# Patient Record
Sex: MALE | Race: WHITE | NOT HISPANIC OR LATINO | Employment: FULL TIME | URBAN - METROPOLITAN AREA
[De-identification: names, ages, dates, MRNs, and addresses within clinical notes are randomized per-mention and may not be internally consistent; named-entity substitution may affect disease eponyms.]

---

## 2017-11-28 ENCOUNTER — OFFICE VISIT (OUTPATIENT)
Dept: URGENT CARE | Facility: CLINIC | Age: 32
End: 2017-11-28
Payer: COMMERCIAL

## 2017-11-28 VITALS
HEIGHT: 72 IN | HEART RATE: 84 BPM | WEIGHT: 125 LBS | SYSTOLIC BLOOD PRESSURE: 90 MMHG | BODY MASS INDEX: 16.93 KG/M2 | TEMPERATURE: 99.3 F | OXYGEN SATURATION: 96 % | DIASTOLIC BLOOD PRESSURE: 66 MMHG

## 2017-11-28 DIAGNOSIS — J10.1 INFLUENZA A: ICD-10-CM

## 2017-11-28 LAB
FLUAV+FLUBV AG SPEC QL IA: NORMAL
INT CON NEG: NEGATIVE
INT CON POS: POSITIVE

## 2017-11-28 PROCEDURE — 87804 INFLUENZA ASSAY W/OPTIC: CPT | Performed by: PHYSICIAN ASSISTANT

## 2017-11-28 PROCEDURE — 99204 OFFICE O/P NEW MOD 45 MIN: CPT | Performed by: PHYSICIAN ASSISTANT

## 2017-11-28 RX ORDER — IBUPROFEN 800 MG/1
800 TABLET ORAL EVERY 8 HOURS PRN
COMMUNITY
End: 2018-04-02

## 2017-11-28 RX ORDER — ACETAMINOPHEN 500 MG
500-1000 TABLET ORAL EVERY 6 HOURS PRN
COMMUNITY
End: 2018-04-02

## 2017-11-28 RX ORDER — BENZONATATE 200 MG/1
200 CAPSULE ORAL 3 TIMES DAILY PRN
Qty: 30 CAP | Refills: 0 | Status: SHIPPED | OUTPATIENT
Start: 2017-11-28 | End: 2018-04-02

## 2017-11-28 RX ORDER — OSELTAMIVIR PHOSPHATE 75 MG/1
75 CAPSULE ORAL 2 TIMES DAILY
Qty: 10 CAP | Refills: 0 | Status: SHIPPED | OUTPATIENT
Start: 2017-11-28 | End: 2017-12-03

## 2017-11-28 ASSESSMENT — ENCOUNTER SYMPTOMS
SENSORY CHANGE: 0
SPUTUM PRODUCTION: 0
RHINORRHEA: 1
FEVER: 1
TINGLING: 0
COUGH: 1
SHORTNESS OF BREATH: 0
SORE THROAT: 1
CHILLS: 1
WHEEZING: 0
DIZZINESS: 0
SINUS PAIN: 0
FOCAL WEAKNESS: 0
VOMITING: 1
MYALGIAS: 1
PALPITATIONS: 0
HEADACHES: 1
NAUSEA: 1
HEMOPTYSIS: 0

## 2017-11-28 ASSESSMENT — COPD QUESTIONNAIRES: COPD: 0

## 2017-11-28 NOTE — LETTER
November 28, 2017         Patient: Mike Culver   YOB: 1985   Date of Visit: 11/28/2017           To Whom it May Concern:     Mike Culver was seen in my clinic on 11/28/2017. He may return to work on 12/1/17 if feeling better.    If you have any questions or concerns, please don't hesitate to call.        Sincerely,           Darlin Pino P.A.-C.  Electronically Signed

## 2017-11-29 NOTE — PROGRESS NOTES
Subjective:      Mike Culver is a 32 y.o. male who presents with Cough (sore throat, nausea, fevers, x 4 days)            Cough   This is a new problem. Episode onset: 2 days. The problem has been unchanged. The cough is non-productive. Associated symptoms include chills, a fever, headaches, myalgias, nasal congestion, rhinorrhea and a sore throat. Pertinent negatives include no chest pain, ear congestion, ear pain, hemoptysis, rash, shortness of breath or wheezing. Nothing aggravates the symptoms. Treatments tried: OTC cold and flu medication. The treatment provided mild relief. There is no history of asthma, bronchitis, COPD or pneumonia.     History reviewed. No pertinent past medical history.  History reviewed. No pertinent surgical history.    History reviewed. No pertinent family history.    No Known Allergies    Medications, Allergies, and current problem list reviewed today in Epic      Review of Systems   Constitutional: Positive for chills, fever and malaise/fatigue.   HENT: Positive for congestion, rhinorrhea and sore throat. Negative for ear discharge, ear pain and sinus pain.    Respiratory: Positive for cough. Negative for hemoptysis, sputum production, shortness of breath and wheezing.    Cardiovascular: Negative for chest pain, palpitations and leg swelling.   Gastrointestinal: Positive for nausea and vomiting.   Musculoskeletal: Positive for myalgias.   Skin: Negative for rash.   Neurological: Positive for headaches. Negative for dizziness, tingling, sensory change and focal weakness.     All other systems reviewed and are negative.        Objective:     BP (!) 90/66   Pulse 84   Temp 37.4 °C (99.3 °F)   Ht 1.829 m (6')   Wt 56.7 kg (125 lb)   SpO2 96%   BMI 16.95 kg/m²      Physical Exam   Constitutional: He is oriented to person, place, and time. He appears well-developed and well-nourished. He appears distressed (ill appearing ).   HENT:   Head: Normocephalic and atraumatic.   Right  Ear: Tympanic membrane, external ear and ear canal normal.   Left Ear: Tympanic membrane, external ear and ear canal normal.   Nose: Mucosal edema and rhinorrhea present.   Mouth/Throat: Uvula is midline and mucous membranes are normal. Posterior oropharyngeal erythema (mild) present.   Eyes: Conjunctivae are normal.   Cardiovascular: Normal rate, regular rhythm and normal heart sounds.  Exam reveals no gallop and no friction rub.    No murmur heard.  Pulmonary/Chest: Effort normal and breath sounds normal. No respiratory distress. He has no decreased breath sounds. He has no wheezes. He has no rhonchi. He has no rales.   Lymphadenopathy:     He has no cervical adenopathy.   Neurological: He is alert and oriented to person, place, and time. No cranial nerve deficit.   Psychiatric: He has a normal mood and affect. His behavior is normal. Judgment and thought content normal.               Assessment/Plan:     1. Influenza A    - POCT Influenza A/B  - oseltamivir (TAMIFLU) 75 MG Cap; Take 1 Cap by mouth 2 times a day for 5 days.  Dispense: 10 Cap; Refill: 0  - benzonatate (TESSALON) 200 MG capsule; Take 1 Cap by mouth 3 times a day as needed for Cough.  Dispense: 30 Cap; Refill: 0     Differential diagnoses, Supportive care, and indications for immediate follow-up discussed with patient.   Instructed to return to clinic or nearest emergency department for any change in condition, further concerns, or worsening of symptoms.    .The patient demonstrated a good understanding and agreed with the treatment plan.    Darlin Pino P.A.-C.

## 2018-03-30 ENCOUNTER — OFFICE VISIT (OUTPATIENT)
Dept: URGENT CARE | Facility: CLINIC | Age: 33
End: 2018-03-30
Payer: COMMERCIAL

## 2018-03-30 VITALS
BODY MASS INDEX: 16.93 KG/M2 | TEMPERATURE: 98 F | OXYGEN SATURATION: 96 % | RESPIRATION RATE: 16 BRPM | HEIGHT: 72 IN | WEIGHT: 125 LBS | SYSTOLIC BLOOD PRESSURE: 116 MMHG | HEART RATE: 57 BPM | DIASTOLIC BLOOD PRESSURE: 82 MMHG

## 2018-03-30 DIAGNOSIS — G43.719 INTRACTABLE CHRONIC MIGRAINE WITHOUT AURA AND WITHOUT STATUS MIGRAINOSUS: ICD-10-CM

## 2018-03-30 PROCEDURE — 99214 OFFICE O/P EST MOD 30 MIN: CPT | Mod: 25 | Performed by: PHYSICIAN ASSISTANT

## 2018-03-30 RX ORDER — KETOROLAC TROMETHAMINE 30 MG/ML
60 INJECTION, SOLUTION INTRAMUSCULAR; INTRAVENOUS ONCE
Status: COMPLETED | OUTPATIENT
Start: 2018-03-30 | End: 2018-03-30

## 2018-03-30 RX ORDER — SUMATRIPTAN 50 MG/1
50 TABLET, FILM COATED ORAL
Qty: 10 TAB | Refills: 0 | Status: SHIPPED | OUTPATIENT
Start: 2018-03-30 | End: 2018-04-02

## 2018-03-30 RX ADMIN — KETOROLAC TROMETHAMINE 60 MG: 30 INJECTION, SOLUTION INTRAMUSCULAR; INTRAVENOUS at 17:42

## 2018-03-30 ASSESSMENT — ENCOUNTER SYMPTOMS
MIGRAINE HEADACHES: 1
MYALGIAS: 0
FEVER: 0
NUMBNESS: 0
HEADACHES: 1
EYE PAIN: 0
SENSORY CHANGE: 0
EYE REDNESS: 0
SINUS PAIN: 0
BLURRED VISION: 0
FOCAL WEAKNESS: 0
LOSS OF BALANCE: 0
SINUS PRESSURE: 0
SEIZURES: 0
NAUSEA: 1
CHILLS: 0
DIZZINESS: 1
ABDOMINAL PAIN: 0
TINGLING: 0
VISUAL CHANGE: 1
VOMITING: 0
SHORTNESS OF BREATH: 0
ABNORMAL BEHAVIOR: 0
PHOTOPHOBIA: 0
CLUSTER HEADACHES: 0

## 2018-03-31 NOTE — PROGRESS NOTES
Subjective:      Mike Culver is a 33 y.o. male who presents with Migraine (x 1 week  / nausea )            Headache    This is a new problem. Episode onset: 1 week  The problem occurs constantly. The problem has been waxing and waning. The pain is located in the vertex region. The pain does not radiate. The pain quality is similar to prior headaches (besides occurence of blurred vision yesterday). The quality of the pain is described as aching. The pain is at a severity of 3/10. Associated symptoms include dizziness (slight dizziness), nausea and a visual change (small visual change yesterday). Pertinent negatives include no abdominal pain, abnormal behavior, blurred vision, drainage, ear pain, eye pain, eye redness, fever, loss of balance, muscle aches, numbness, phonophobia, photophobia, seizures, sinus pressure, tingling or vomiting. The symptoms are aggravated by emotional stress. Treatments tried: midol, Ibuprofen. The treatment provided no relief. His past medical history is significant for migraine headaches and migraines in the family. There is no history of cancer, cluster headaches, hypertension, recent head traumas or sinus disease.       History reviewed. No pertinent past medical history.    History reviewed. No pertinent surgical history.    History reviewed. No pertinent family history.    No Known Allergies    Medications, Allergies, and current problem list reviewed today in Epic    Review of Systems   Constitutional: Negative for chills, fever and malaise/fatigue.   HENT: Negative for ear pain, sinus pain and sinus pressure.    Eyes: Negative for blurred vision, photophobia, pain and redness.   Respiratory: Negative for shortness of breath.    Cardiovascular: Negative for chest pain and leg swelling.   Gastrointestinal: Positive for nausea. Negative for abdominal pain and vomiting.   Musculoskeletal: Negative for myalgias.   Skin: Negative for rash.   Neurological: Positive for dizziness  (slight dizziness) and headaches. Negative for tingling, sensory change, focal weakness, seizures, numbness and loss of balance.     All other systems reviewed and are negative.        Objective:     /82   Pulse (!) 57   Temp 36.7 °C (98 °F)   Resp 16   Ht 1.829 m (6')   Wt 56.7 kg (125 lb)   SpO2 96%   BMI 16.95 kg/m²      Physical Exam   Constitutional: He is oriented to person, place, and time. He appears well-developed and well-nourished. No distress.   HENT:   Head: Normocephalic and atraumatic.   Eyes: Conjunctivae and EOM are normal. Pupils are equal, round, and reactive to light.   Neck: Neck supple.   Cardiovascular: Normal rate, regular rhythm and normal heart sounds.    Pulmonary/Chest: Effort normal and breath sounds normal. No respiratory distress. He has no wheezes. He has no rales.   Lymphadenopathy:     He has no cervical adenopathy.   Neurological: He is alert and oriented to person, place, and time.   CN II-XII intact. Cerebellar function  intact. Motor coordination intact.  strength strong and symmetrical bilaterally. Proprioception intact. Strength 5/5 equal in the upper and lower extremities. Facial features symmetric with equal movement. No focal deficits. Romberg negative     Skin: Skin is warm and dry. No rash noted.   Psychiatric: He has a normal mood and affect. His behavior is normal. Judgment and thought content normal.               Assessment/Plan:     1. Intractable chronic migraine without aura and without status migrainosus  ketorolac (TORADOL) injection 60 mg    REFERRAL TO FAMILY PRACTICE    SUMAtriptan (IMITREX) 50 MG Tab       Toradol 60 mg Im given today. Patient reports relief after shot.   Referral placed to Family Practice to follow-up with PCP    Current Outpatient Prescriptions:   •  SUMAtriptan (IMITREX) 50 MG Tab, Take 1 Tab by mouth Once PRN for Migraine for up to 1 dose. May repeat dose x 1 after 2 hours. Do not exceed 100 mg/24 hours., Disp: 10 Tab,  Rfl: 0     Differential diagnoses, Supportive care, and indications for immediate follow-up discussed with patient.   Instructed to return to clinic or nearest emergency department for any change in condition, further concerns, or worsening of symptoms.    The patient demonstrated a good understanding and agreed with the treatment plan.    Darlin Pino P.A.-C.

## 2018-04-02 ENCOUNTER — OFFICE VISIT (OUTPATIENT)
Dept: MEDICAL GROUP | Facility: MEDICAL CENTER | Age: 33
End: 2018-04-02
Payer: COMMERCIAL

## 2018-04-02 VITALS
RESPIRATION RATE: 14 BRPM | BODY MASS INDEX: 17.62 KG/M2 | TEMPERATURE: 98.2 F | WEIGHT: 130.07 LBS | HEIGHT: 72 IN | SYSTOLIC BLOOD PRESSURE: 100 MMHG | DIASTOLIC BLOOD PRESSURE: 64 MMHG | OXYGEN SATURATION: 96 % | HEART RATE: 58 BPM

## 2018-04-02 DIAGNOSIS — Z87.39 H/O JUVENILE RHEUMATOID ARTHRITIS: ICD-10-CM

## 2018-04-02 DIAGNOSIS — G40.309 NONINTRACTABLE GENERALIZED IDIOPATHIC EPILEPSY WITHOUT STATUS EPILEPTICUS (HCC): ICD-10-CM

## 2018-04-02 DIAGNOSIS — G43.109 MIGRAINE WITH AURA AND WITHOUT STATUS MIGRAINOSUS, NOT INTRACTABLE: ICD-10-CM

## 2018-04-02 PROCEDURE — 99214 OFFICE O/P EST MOD 30 MIN: CPT | Performed by: FAMILY MEDICINE

## 2018-04-02 RX ORDER — SUMATRIPTAN 100 MG/1
100 TABLET, FILM COATED ORAL
Qty: 10 TAB | Refills: 3 | Status: SHIPPED | OUTPATIENT
Start: 2018-04-02 | End: 2018-05-16 | Stop reason: SDUPTHER

## 2018-04-02 ASSESSMENT — PATIENT HEALTH QUESTIONNAIRE - PHQ9: CLINICAL INTERPRETATION OF PHQ2 SCORE: 0

## 2018-04-02 NOTE — ASSESSMENT & PLAN NOTE
This is a chronic health problem that is well controlled with current medications and lifestyle measures.  Patient had active symptoms as a child that stopped at age 13. Prior to that he had gone to [facility] a couple of times to utilize bracing to try and prevent some of the deformity of his joints. He does have some deformities in his hands and toes as well as some range of motion issues. He does not have joint pain at this time.

## 2018-04-02 NOTE — ASSESSMENT & PLAN NOTE
This is an acute problem on a chronic problem. This patient's had migraine headaches for approximately 8 years. He had never had prescription medications to take for it. He typically was utilizing Midol because it has an antihistamine in it so he would be able to blunt some of the effects that Tylenol causes for him. Tylenol medications tend to make him feel as if he has an upper respiratory infection. With the antihistamine along with that he was not having as much of a reaction. Patient did get to try Imitrex 50 mg just got that a couple of days ago it was helpful but still took several hours for his headaches go away. I think he would do better with pemetrexed 100 mg we will try that. We will also have him keep track of how many migraines he has enough for week. To see if we need to look at suppressive medications.

## 2018-04-02 NOTE — PROGRESS NOTES
CC:  Diagnoses of Migraine with aura and without status migrainosus, not intractable, H/O juvenile rheumatoid arthritis, and Nonintractable generalized idiopathic epilepsy without status epilepticus (CMS-HCC) were pertinent to this visit.    HISTORY OF THE PRESENT ILLNESS: Patient is a 33 y.o. male. This pleasant patient is here today to establish care and discuss migraine headaches that are getting worse for him.    Health Maintenance: Completed      Migraine with aura and without status migrainosus, not intractable  This is an acute problem on a chronic problem. This patient's had migraine headaches for approximately 8 years. He had never had prescription medications to take for it. He typically was utilizing Midol because it has an antihistamine in it so he would be able to blunt some of the effects that Tylenol causes for him. Tylenol medications tend to make him feel as if he has an upper respiratory infection. With the antihistamine along with that he was not having as much of a reaction. Patient did get to try Imitrex 50 mg just got that a couple of days ago it was helpful but still took several hours for his headaches go away. I think he would do better with pemetrexed 100 mg we will try that. We will also have him keep track of how many migraines he has enough for week. To see if we need to look at suppressive medications.    H/O juvenile rheumatoid arthritis  This is a chronic health problem that is well controlled with current medications and lifestyle measures.  Patient had active symptoms as a child that stopped at age 13. Prior to that he had gone to [facility] a couple of times to utilize bracing to try and prevent some of the deformity of his joints. He does have some deformities in his hands and toes as well as some range of motion issues. He does not have joint pain at this time.    Nonintractable generalized idiopathic epilepsy without status epilepticus (CMS-HCC)  This was a problem for the patient  where there was one witnessed seizure he has had a couple fainting spells or out in the woods when he was hiking so there is no witness as to whether or not there is seizure activity. For short time patient was medicated but then has been off the medication without any further seizures. He did have an EEG and workup for the seizures that was considered to be epilepsy. He is not medicated at this time.    Allergies: Patient has no known allergies.    Current Outpatient Prescriptions Ordered in [facility]   Medication Sig Dispense Refill   • sumatriptan (IMITREX) 100 MG tablet Take 1 Tab by mouth Once PRN for Migraine for up to 1 dose. 10 Tab 3     No current Epic-ordered facility-administered medications on file.        Past Medical History:   Diagnosis Date   • H/O juvenile rheumatoid arthritis 4/2/2018   • Migraine with aura and without status migrainosus, not intractable 4/2/2018   • Nonintractable generalized idiopathic epilepsy without status epilepticus (CMS-HCC) 4/2/2018       History reviewed. No pertinent surgical history.    Social History   Substance Use Topics   • Smoking status: Former Smoker     Years: 4.00     Types: Cigarettes     Quit date: 5/28/2017   • Smokeless tobacco: Never Used      Comment: Former Hookah   • Alcohol use No      Comment: Rarely       Social History     Social History Narrative   • No narrative on file       Family History   Problem Relation Age of Onset   • Lung Disease Mother      COPD in a smoker   • Other Mother      epilepsy   • Arthritis Mother        ROS:     - Constitutional: Negative for fever, chills, unexpected weight change, and fatigue/generalized weakness.     - HEENT: Negative for headaches, vision changes, hearing changes, ear pain, ear discharge, rhinorrhea, sinus congestion, sore throat, and neck pain.      - Respiratory: Negative for cough, sputum production, chest congestion, dyspnea, wheezing, and crackles.      - Cardiovascular: Negative for chest pain,  palpitations, orthopnea, and bilateral lower extremity edema.     - Gastrointestinal: Negative for heartburn, nausea, vomiting, abdominal pain, hematochezia, melena, diarrhea, constipation, and greasy/foul-smelling stools.     - Genitourinary: Negative for dysuria, polyuria, hematuria, pyuria, urinary urgency, and urinary incontinence.     - Musculoskeletal: Patient has deformities of the joints of his hands and his feet from juvenile rheumatoid arthritis. Otherwise, negative for myalgias, back pain, and joint pain.     - Skin: Negative for rash, itching, cyanotic skin color change.     - Neurological: Migraine headaches as in history of present illness otherwise, negative for dizziness, tingling, tremors, focal sensory deficit, focal weakness and headaches.     - Endo/Heme/Allergies: Does not bruise/bleed easily.     - Psychiatric/Behavioral: Negative for depression, suicidal/homicidal ideation and memory loss.        - NOTE: All other systems reviewed and are negative, except as in HPI.      .      Exam: Blood pressure 100/64, pulse (!) 58, temperature 36.8 °C (98.2 °F), resp. rate 14, height 1.829 m (6'), weight 59 kg (130 lb 1.1 oz), SpO2 96 %. Body mass index is 17.64 kg/m².    General: Normal appearing. No distress.  HEENT: Normocephalic. Eyes conjunctiva clear lids without ptosis, pupils equal and reactive to light accommodation, ears normal shape and contour, canals are clear bilaterally, tympanic membranes are benign, nasal mucosa benign, oropharynx is without erythema, edema or exudates.   Neck: Supple without JVD or bruit. Thyroid is not enlarged.  Pulmonary: Clear to ausculation.  Normal effort. No rales, ronchi, or wheezing.  Cardiovascular: Regular rate and rhythm without murmur. Carotid and radial pulses are intact and equal bilaterally.  Abdomen: Soft, nontender, nondistended. Normal bowel sounds. Liver and spleen are not palpable  Neurologic: Grossly nonfocal  Lymph: No cervical, supraclavicular or  axillary lymph nodes are palpable  Skin: Warm and dry.  No obvious lesions.  Musculoskeletal: Normal gait. No extremity cyanosis, clubbing, or edema. Obvious deformities of the MP joints of his hands bilaterally  Psych: Normal mood and affect. Alert and oriented x3. Judgment and insight is normal.    Please note that this dictation was created using voice recognition software. I have made every reasonable attempt to correct obvious errors, but I expect that there are errors of grammar and possibly content that I did not discover before finalizing the note.      Assessment/Plan  1. Migraine with aura and without status migrainosus, not intractable  Uncontrolled, patient just started on Imitrex 50 mg. He did find some relief with that medication. We'll have him start with Imitrex 100 mg and then see us back in 6 weeks with a headache calendar    2. H/O juvenile rheumatoid arthritis  Currently stable    3. Nonintractable generalized idiopathic epilepsy without status epilepticus (CMS-HCC)  Controlled, patient not meeting anti-seizure medications at this time.

## 2018-04-02 NOTE — ASSESSMENT & PLAN NOTE
This was a problem for the patient where there was one witnessed seizure he has had a couple fainting spells or out in the woods when he was hiking so there is no witness as to whether or not there is seizure activity. For short time patient was medicated but then has been off the medication without any further seizures. He did have an EEG and workup for the seizures that was considered to be epilepsy. He is not medicated at this time.

## 2018-05-16 ENCOUNTER — OFFICE VISIT (OUTPATIENT)
Dept: MEDICAL GROUP | Facility: MEDICAL CENTER | Age: 33
End: 2018-05-16
Payer: COMMERCIAL

## 2018-05-16 VITALS
BODY MASS INDEX: 17.8 KG/M2 | TEMPERATURE: 98.2 F | SYSTOLIC BLOOD PRESSURE: 100 MMHG | RESPIRATION RATE: 14 BRPM | DIASTOLIC BLOOD PRESSURE: 62 MMHG | HEIGHT: 72 IN | HEART RATE: 86 BPM | OXYGEN SATURATION: 98 % | WEIGHT: 131.39 LBS

## 2018-05-16 DIAGNOSIS — G43.109 MIGRAINE WITH AURA AND WITHOUT STATUS MIGRAINOSUS, NOT INTRACTABLE: ICD-10-CM

## 2018-05-16 PROCEDURE — 99214 OFFICE O/P EST MOD 30 MIN: CPT | Performed by: FAMILY MEDICINE

## 2018-05-16 RX ORDER — SUMATRIPTAN 100 MG/1
100 TABLET, FILM COATED ORAL
Qty: 10 TAB | Refills: 11 | Status: SHIPPED | OUTPATIENT
Start: 2018-05-16 | End: 2018-07-23

## 2018-05-16 RX ORDER — TOPIRAMATE 50 MG/1
50-100 TABLET, FILM COATED ORAL
Qty: 60 TAB | Refills: 3 | Status: SHIPPED | OUTPATIENT
Start: 2018-05-16 | End: 2018-09-21 | Stop reason: SDUPTHER

## 2018-05-16 NOTE — ASSESSMENT & PLAN NOTE
This is a chronic health problem for this patient that he has seen some improvement with being on Imitrex, but it is not perfect. He gets the typical neckache and some dizziness when the Imitrex is actually going to work to extinguish his migraine. But he also has times where he will take the Imitrex in the migraine doesn't get any worse but it certainly is not extinguish. He's recently had a change over the last 4 weeks and his work schedule and is working longer days having to go to sleep during the day. He has not been quite as successful at that and has noted more migraines. He is having 2-3 migraines per week. I think at that frequency we should consider utilizing Topamax to see if we can extinguish them or at least limit them. We are going to start with 50 mg at bedtime (which is 9 AM) and after 2 weeks if we aren't seeing improvement in the number of migraines we will go up to 100 mg. Patient has refills on his Imitrex.

## 2018-09-24 ENCOUNTER — TELEPHONE (OUTPATIENT)
Dept: MEDICAL GROUP | Facility: MEDICAL CENTER | Age: 33
End: 2018-09-24

## 2018-09-24 NOTE — TELEPHONE ENCOUNTER
Was the patient seen in the last year in this department? Yes    Does patient have an active prescription for medications requested? No     Received Request Via: Patient    This was sent back to us as a prescribing error. Not sure why so I'll forward to you to see.

## 2018-11-06 ENCOUNTER — OFFICE VISIT (OUTPATIENT)
Dept: MEDICAL GROUP | Facility: MEDICAL CENTER | Age: 33
End: 2018-11-06
Payer: COMMERCIAL

## 2018-11-06 VITALS
HEART RATE: 73 BPM | DIASTOLIC BLOOD PRESSURE: 72 MMHG | WEIGHT: 132.5 LBS | RESPIRATION RATE: 14 BRPM | TEMPERATURE: 98.2 F | BODY MASS INDEX: 17.95 KG/M2 | SYSTOLIC BLOOD PRESSURE: 120 MMHG | HEIGHT: 72 IN | OXYGEN SATURATION: 98 %

## 2018-11-06 DIAGNOSIS — G43.109 MIGRAINE WITH AURA AND WITHOUT STATUS MIGRAINOSUS, NOT INTRACTABLE: ICD-10-CM

## 2018-11-06 DIAGNOSIS — Z23 NEEDS FLU SHOT: ICD-10-CM

## 2018-11-06 DIAGNOSIS — M79.18 MUSCLE PAIN, MYOFASCIAL: ICD-10-CM

## 2018-11-06 PROCEDURE — 90471 IMMUNIZATION ADMIN: CPT | Performed by: FAMILY MEDICINE

## 2018-11-06 PROCEDURE — 90686 IIV4 VACC NO PRSV 0.5 ML IM: CPT | Performed by: FAMILY MEDICINE

## 2018-11-06 PROCEDURE — 99214 OFFICE O/P EST MOD 30 MIN: CPT | Mod: 25 | Performed by: FAMILY MEDICINE

## 2018-11-06 RX ORDER — DICLOFENAC SODIUM 75 MG/1
75 TABLET, DELAYED RELEASE ORAL 2 TIMES DAILY
Qty: 60 TAB | Refills: 1 | Status: SHIPPED | OUTPATIENT
Start: 2018-11-06 | End: 2018-12-05 | Stop reason: SDUPTHER

## 2018-11-06 RX ORDER — SUMATRIPTAN 100 MG/1
100 TABLET, FILM COATED ORAL
Qty: 18 TAB | Refills: 3 | Status: SHIPPED | OUTPATIENT
Start: 2018-11-06 | End: 2019-03-21

## 2018-11-06 NOTE — ASSESSMENT & PLAN NOTE
This is a chronic problem for which we have placed the patient on Topamax 100 mg nightly.  He noted before he ran out of the prescription which unfortunately he was not able to get a refill of, that he was actually starting to have more migraines.  He had 10 migraines in a 2-week timeframe.  Patient found that utilizing Imitrex along with Midol or Imitrex with Excedrin will make the migraine go away but unfortunately he cannot take it as often as he was having migraines.  I am concerned that the patient's migraine is starting to change to being more frequent.  I would like for him to be seen at the headache clinic to see if they possibly would consider doing Botox injections to try and help this.

## 2018-11-06 NOTE — PROGRESS NOTES
CC:Diagnoses of Needs flu shot, Muscle pain, myofascial, and Migraine with aura and without status migrainosus, not intractable were pertinent to this visit.    HISTORY OF PRESENT ILLNESS: Patient is a 33 y.o. male established patient who presents today to discuss his migraines which seem to be occurring more often and having more intensity as well as the fact that he is developed a myofascial type pain that may be myositis.    Health Maintenance: Completed    Muscle pain, myofascial  This is a new problem with ongoing muscle pain that has been there for maybe 2-3 months.  This patient works out at [employer] and typically was able to recoup over the weekends and go right back to work not having any residual muscle pain.  He now finds that even with a 4-day weekend he cannot completely recover.  He has no new work duties that would cause this pain.  He is finding that he has myalgias in his shoulders and his hips but other places as well.  It appears to be myofascial pain.  It at its highest goes up to a 6-7/10 but even when he is laying in bed on his days off it will be at a 3-4/10 level.  Patient has been seeing a chiropractor to help with back pain but that has not seemed to make much of a difference with this myofascial pain.  He also has tried ibuprofen but because he has a resistance to this from having juvenile rheumatoid arthritis he tends to have to take very high doses that he does not want to do.  He also has tried utilizing over-the-counter Midol and that has helped somewhat but definitely has not persisted in keeping his pain under control.  Patient has noted some associated weakness but mostly because of the pain.  The muscles themselves are not tender to the touch.  He does have sometimes where the muscles have felt knotted and massaging them has been uncomfortable but that is not a consistent feature of this illness.    Migraine with aura and without status migrainosus, not intractable  This is a chronic  problem for which we have placed the patient on Topamax 100 mg nightly.  He noted before he ran out of the prescription which unfortunately he was not able to get a refill of, that he was actually starting to have more migraines.  He had 10 migraines in a 2-week timeframe.  Patient found that utilizing Imitrex along with Midol or Imitrex with Excedrin will make the migraine go away but unfortunately he cannot take it as often as he was having migraines.  I am concerned that the patient's migraine is starting to change to being more frequent.  I would like for him to be seen at the headache clinic to see if they possibly would consider doing Botox injections to try and help this.      Patient Active Problem List    Diagnosis Date Noted   • Muscle pain, myofascial 11/06/2018   • Migraine with aura and without status migrainosus, not intractable 04/02/2018   • H/O juvenile rheumatoid arthritis 04/02/2018   • Nonintractable generalized idiopathic epilepsy without status epilepticus (HCC) 04/02/2018      Allergies:Patient has no known allergies.    Current Outpatient Prescriptions   Medication Sig Dispense Refill   • sumatriptan (IMITREX) 100 MG tablet Take 1 Tab by mouth Once PRN for Migraine for up to 1 dose. 18 Tab 3   • diclofenac EC (VOLTAREN) 75 MG Tablet Delayed Response Take 1 Tab by mouth 2 times a day. 60 Tab 1     No current facility-administered medications for this visit.        Social History   Substance Use Topics   • Smoking status: Former Smoker     Years: 4.00     Types: Cigarettes     Quit date: 5/28/2017   • Smokeless tobacco: Never Used      Comment: Former Hookah   • Alcohol use No      Comment: Rarely     Social History     Social History Narrative   • No narrative on file       Family History   Problem Relation Age of Onset   • Lung Disease Mother         COPD in a smoker   • Other Mother         epilepsy   • Arthritis Mother         ROS:     - Constitutional: Increasing fatigue and slight  increase in weakness but denies fevers and chills.     - HEENT:  Negative for vision changes, hearing changes, ear pain, ear discharge, rhinorrhea, sinus congestion, sore throat, and neck pain.      - Respiratory: Negative for cough, sputum production, chest congestion, dyspnea, wheezing, and crackles.      - Cardiovascular: Negative for chest pain, palpitations, orthopnea, and bilateral lower extremity edema.     - Gastrointestinal: Negative for heartburn, nausea, vomiting, abdominal pain, hematochezia, melena, diarrhea, constipation, and greasy/foul-smelling stools.     - Genitourinary: Negative for dysuria, polyuria, hematuria, pyuria, urinary urgency, and urinary incontinence.     - Musculoskeletal: Positive for myalgias as described in HPI otherwise denies joint pain.          Exam:    Blood pressure 120/72, pulse 73, temperature 36.8 °C (98.2 °F), temperature source Temporal, resp. rate 14, height 1.829 m (6'), weight 60.1 kg (132 lb 7.9 oz), SpO2 98 %. Body mass index is 17.97 kg/m².    General:  Well nourished, well developed male in NAD    Patient was seen for 30 minutes face to face of which, 5 minutes was spent counseling regarding current symptomatology and possible etiologies.  Discussed options for workup and treatment versus referral.  Patient would like to proceed with workup initially.  Also discussed options on medications..    Please note that this dictation was created using voice recognition software. I have made every reasonable attempt to correct obvious errors, but I expect that there are errors of grammar and possibly content that I did not discover before finalizing the note.    Assessment/Plan:  1. Needs flu shot  Given today  - Influenza Vaccine Quad Injection >3Y (PF)    2. Muscle pain, myofascial  Uncontrolled, we need to get a multitude of blood tests done on this patient.  He does have a history of rheumatoid arthritis as a child that has since been quiesced sent.  There is every risk  that he is developing a new autoimmune disease.  - WESTERGREN SED RATE; Future  - CKMB; Future  - ALDOLASE; Future  - COMP METABOLIC PANEL; Future  - CBC WITH DIFFERENTIAL; Future  - TSH WITH REFLEX TO FT4; Future  - CRP HIGH SENSITIVE (CARDIAC); Future    3. Migraine with aura and without status migrainosus, not intractable  Uncontrolled, patient will continue with Imitrex and I have written for a larger dose but he will also go to the [facility] clinic to see if he could qualify for Botox treatments for his migraines.  - REFERRAL TO NEUROLOGY

## 2018-11-06 NOTE — ASSESSMENT & PLAN NOTE
This is a new problem with ongoing muscle pain that has been there for maybe 2-3 months.  This patient works out at [employer] and typically was able to recoup over the weekends and go right back to work not having any residual muscle pain.  He now finds that even with a 4-day weekend he cannot completely recover.  He has no new work duties that would cause this pain.  He is finding that he has myalgias in his shoulders and his hips but other places as well.  It appears to be myofascial pain.  It at its highest goes up to a 6-7/10 but even when he is laying in bed on his days off it will be at a 3-4/10 level.  Patient has been seeing a chiropractor to help with back pain but that has not seemed to make much of a difference with this myofascial pain.  He also has tried ibuprofen but because he has a resistance to this from having juvenile rheumatoid arthritis he tends to have to take very high doses that he does not want to do.  He also has tried utilizing over-the-counter Midol and that has helped somewhat but definitely has not persisted in keeping his pain under control.  Patient has noted some associated weakness but mostly because of the pain.  The muscles themselves are not tender to the touch.  He does have sometimes where the muscles have felt knotted and massaging them has been uncomfortable but that is not a consistent feature of this illness.

## 2018-11-07 ENCOUNTER — HOSPITAL ENCOUNTER (OUTPATIENT)
Dept: LAB | Facility: MEDICAL CENTER | Age: 33
End: 2018-11-07
Attending: FAMILY MEDICINE
Payer: COMMERCIAL

## 2018-11-07 DIAGNOSIS — M79.18 MUSCLE PAIN, MYOFASCIAL: ICD-10-CM

## 2018-11-07 LAB
ALBUMIN SERPL BCP-MCNC: 4.2 G/DL (ref 3.2–4.9)
ALBUMIN/GLOB SERPL: 1.4 G/DL
ALP SERPL-CCNC: 63 U/L (ref 30–99)
ALT SERPL-CCNC: 14 U/L (ref 2–50)
ANION GAP SERPL CALC-SCNC: 6 MMOL/L (ref 0–11.9)
AST SERPL-CCNC: 17 U/L (ref 12–45)
BASOPHILS # BLD AUTO: 1.2 % (ref 0–1.8)
BASOPHILS # BLD: 0.06 K/UL (ref 0–0.12)
BILIRUB SERPL-MCNC: 0.4 MG/DL (ref 0.1–1.5)
BUN SERPL-MCNC: 14 MG/DL (ref 8–22)
CALCIUM SERPL-MCNC: 9.4 MG/DL (ref 8.5–10.5)
CHLORIDE SERPL-SCNC: 106 MMOL/L (ref 96–112)
CO2 SERPL-SCNC: 28 MMOL/L (ref 20–33)
CREAT SERPL-MCNC: 0.85 MG/DL (ref 0.5–1.4)
CRP SERPL HS-MCNC: 2 MG/L (ref 0–7.5)
EOSINOPHIL # BLD AUTO: 0.13 K/UL (ref 0–0.51)
EOSINOPHIL NFR BLD: 2.5 % (ref 0–6.9)
ERYTHROCYTE [DISTWIDTH] IN BLOOD BY AUTOMATED COUNT: 41.1 FL (ref 35.9–50)
ERYTHROCYTE [SEDIMENTATION RATE] IN BLOOD BY WESTERGREN METHOD: 9 MM/HOUR (ref 0–15)
GLOBULIN SER CALC-MCNC: 2.9 G/DL (ref 1.9–3.5)
GLUCOSE SERPL-MCNC: 83 MG/DL (ref 65–99)
HCT VFR BLD AUTO: 45.3 % (ref 42–52)
HGB BLD-MCNC: 14.9 G/DL (ref 14–18)
IMM GRANULOCYTES # BLD AUTO: 0.01 K/UL (ref 0–0.11)
IMM GRANULOCYTES NFR BLD AUTO: 0.2 % (ref 0–0.9)
LYMPHOCYTES # BLD AUTO: 1.26 K/UL (ref 1–4.8)
LYMPHOCYTES NFR BLD: 24.4 % (ref 22–41)
MCH RBC QN AUTO: 31.7 PG (ref 27–33)
MCHC RBC AUTO-ENTMCNC: 32.9 G/DL (ref 33.7–35.3)
MCV RBC AUTO: 96.4 FL (ref 81.4–97.8)
MONOCYTES # BLD AUTO: 0.46 K/UL (ref 0–0.85)
MONOCYTES NFR BLD AUTO: 8.9 % (ref 0–13.4)
NEUTROPHILS # BLD AUTO: 3.24 K/UL (ref 1.82–7.42)
NEUTROPHILS NFR BLD: 62.8 % (ref 44–72)
NRBC # BLD AUTO: 0 K/UL
NRBC BLD-RTO: 0 /100 WBC
PLATELET # BLD AUTO: 267 K/UL (ref 164–446)
PMV BLD AUTO: 9.9 FL (ref 9–12.9)
POTASSIUM SERPL-SCNC: 4 MMOL/L (ref 3.6–5.5)
PROT SERPL-MCNC: 7.1 G/DL (ref 6–8.2)
RBC # BLD AUTO: 4.7 M/UL (ref 4.7–6.1)
SODIUM SERPL-SCNC: 140 MMOL/L (ref 135–145)
WBC # BLD AUTO: 5.2 K/UL (ref 4.8–10.8)

## 2018-11-07 PROCEDURE — 82085 ASSAY OF ALDOLASE: CPT

## 2018-11-07 PROCEDURE — 85025 COMPLETE CBC W/AUTO DIFF WBC: CPT

## 2018-11-07 PROCEDURE — 84443 ASSAY THYROID STIM HORMONE: CPT

## 2018-11-07 PROCEDURE — 82553 CREATINE MB FRACTION: CPT

## 2018-11-07 PROCEDURE — 36415 COLL VENOUS BLD VENIPUNCTURE: CPT

## 2018-11-07 PROCEDURE — 80053 COMPREHEN METABOLIC PANEL: CPT

## 2018-11-07 PROCEDURE — 86141 C-REACTIVE PROTEIN HS: CPT

## 2018-11-07 PROCEDURE — 85652 RBC SED RATE AUTOMATED: CPT

## 2018-11-08 LAB
CK MB SERPL-MCNC: 1.6 NG/ML (ref 0–5)
TSH SERPL DL<=0.005 MIU/L-ACNC: 3.44 UIU/ML (ref 0.38–5.33)

## 2018-11-09 LAB — ALDOLASE SERPL-CCNC: 3.6 U/L (ref 1.5–8.1)

## 2018-12-05 ENCOUNTER — OFFICE VISIT (OUTPATIENT)
Dept: MEDICAL GROUP | Facility: MEDICAL CENTER | Age: 33
End: 2018-12-05
Payer: COMMERCIAL

## 2018-12-05 VITALS
DIASTOLIC BLOOD PRESSURE: 60 MMHG | TEMPERATURE: 97.6 F | HEART RATE: 72 BPM | WEIGHT: 132.5 LBS | HEIGHT: 72 IN | OXYGEN SATURATION: 96 % | RESPIRATION RATE: 14 BRPM | SYSTOLIC BLOOD PRESSURE: 102 MMHG | BODY MASS INDEX: 17.95 KG/M2

## 2018-12-05 DIAGNOSIS — M79.18 MUSCLE PAIN, MYOFASCIAL: ICD-10-CM

## 2018-12-05 PROCEDURE — 99214 OFFICE O/P EST MOD 30 MIN: CPT | Performed by: FAMILY MEDICINE

## 2018-12-05 RX ORDER — OMEPRAZOLE 20 MG/1
20 CAPSULE, DELAYED RELEASE ORAL DAILY
Qty: 90 CAP | Refills: 3 | Status: SHIPPED | OUTPATIENT
Start: 2018-12-05 | End: 2019-12-23

## 2018-12-05 NOTE — ASSESSMENT & PLAN NOTE
This continues to be a chronic problem for this patient most likely directly related to the amount of physical labor he is currently having to do in his job.  He did see some relief with utilizing the diclofenac extra strength 75 mg twice daily.  He has had a little bit of stomach upset and bloating over the last few weeks.  We could utilize omeprazole to try and counteract that which she is willing to try.  Also he still has muscle pain which I think we could help with utilizing Voltaren gel utilizing the generic.  He can apply that up to 4 times a day as needed.  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    Patient did do blood work for me as I have requested.  His CBC is completely normal, comprehensive metabolic panel is normal, TSH is normal so the muscle pain is not from hypothyroidism.  His C-reactive protein, sed rate and aldolase are all normal so this is not myositis.  I suspect this really just has to do with the actual manual part of his job currently.  And we will look forward to this getting better in the future.

## 2018-12-05 NOTE — PROGRESS NOTES
CC:The encounter diagnosis was Muscle pain, myofascial.    HISTORY OF PRESENT ILLNESS: Patient is a 33 y.o. male established patient who presents today to follow-up on his muscle pain and blood work that was done regarding that.  Patient has seen some improvement with utilizing diclofenac 75 mg twice daily but he still has some residual pain.  He would like to discuss the blood work.    Health Maintenance: Completed    Muscle pain, myofascial  This continues to be a chronic problem for this patient most likely directly related to the amount of physical labor he is currently having to do in his job.  He did see some relief with utilizing the diclofenac extra strength 75 mg twice daily.  He has had a little bit of stomach upset and bloating over the last few weeks.  We could utilize omeprazole to try and counteract that which she is willing to try.  Also he still has muscle pain which I think we could help with utilizing Voltaren gel utilizing the generic.  He can apply that up to 4 times a day as needed.  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    Patient did do blood work for me as I have requested.  His CBC is completely normal, comprehensive metabolic panel is normal, TSH is normal so the muscle pain is not from hypothyroidism.  His C-reactive protein, sed rate and aldolase are all normal so this is not myositis.  I suspect this really just has to do with the actual manual part of his job currently.  And we will look forward to this getting better in the future.      Patient Active Problem List    Diagnosis Date Noted   • Muscle pain, myofascial 11/06/2018   • Migraine with aura and without status migrainosus, not intractable 04/02/2018   • H/O juvenile rheumatoid arthritis 04/02/2018   • Nonintractable generalized idiopathic epilepsy without status epilepticus (HCC) 04/02/2018       Allergies:Acetaminophen and Other drug    Current Outpatient Prescriptions   Medication Sig Dispense Refill   • omeprazole (PRILOSEC) 20 MG delayed-release capsule Take 1 Cap by mouth every day. 90 Cap 3   • Diclofenac Sodium 1 % Gel Apply to painful muscles up to qid 200 g 11   • sumatriptan (IMITREX) 100 MG tablet Take 1 Tab by mouth Once PRN for Migraine for up to 1 dose. 18 Tab 3   • diclofenac EC (VOLTAREN) 75 MG Tablet Delayed Response Take 1 Tab by mouth 2 times a day. 60 Tab 1     No current facility-administered medications for this visit.        Social History   Substance Use Topics   • Smoking status: Former Smoker     Years: 4.00     Types: Cigarettes     Quit date: 5/28/2017   • Smokeless tobacco: Never Used      Comment: Former Hookah   • Alcohol use No      Comment: Rarely     Social History     Social History Narrative   • No narrative on file       Family History   Problem Relation Age of Onset   • Lung Disease Mother         COPD in a smoker   • Other Mother         epilepsy   • Arthritis Mother         ROS:     - Constitutional:  Negative for fever, chills, unexpected weight change, and fatigue/generalized weakness.    - HEENT:  Negative for headaches, vision changes, hearing changes, ear pain, ear discharge, rhinorrhea, sinus congestion, sore throat, and neck pain.      - Respiratory: Negative for cough, sputum production, chest congestion, dyspnea, wheezing, and crackles.      - Cardiovascular: Negative for chest pain, palpitations, orthopnea, and bilateral lower extremity edema.     - Gastrointestinal: Negative for heartburn, nausea, vomiting, abdominal pain, hematochezia, melena, diarrhea, constipation, and greasy/foul-smelling stools.     - Genitourinary: Negative for dysuria, polyuria, hematuria, pyuria, urinary urgency, and urinary incontinence.     - Musculoskeletal: Positive for both joint pain and myofascial pain.  Patient also has low back pain at the end of a workday.       - Skin:  "Negative for rash, itching, cyanotic skin color change.          Exam:    Blood pressure 102/60, pulse 72, temperature 36.4 °C (97.6 °F), temperature source Temporal, resp. rate 14, height 1.829 m (6' 0.01\"), weight 60.1 kg (132 lb 7.9 oz), SpO2 96 %. Body mass index is 17.97 kg/m².    General:  Well nourished, well developed male in NAD  Patient was seen for 25 minutes face to face of which, 20 minutes was spent counseling regarding review of labs, discussion of myositis and why this does not qualify as that disease.  Also discussion of his headaches and the fact he has not heard from his referral.  Discussion of medications interactions and side effects as well as new medications to try..  .  LABS: 11/7/18: Results reviewed and discussed with the patient, questions answered.    Please note that this dictation was created using voice recognition software. I have made every reasonable attempt to correct obvious errors, but I expect that there are errors of grammar and possibly content that I did not discover before finalizing the note.    Assessment/Plan:  1. Muscle pain, myofascial  Borderline control but not as good as I would like to see.  I am can have the patient go ahead and add Voltaren gel to his diclofenac.  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  This will help protect his stomach from the diclofenac.  Patient will let us know how he is doing via my chart.  He is also going to call for his referral to the headache clinic.  It appears from his chart that it is approved.         "

## 2019-02-12 ENCOUNTER — OFFICE VISIT (OUTPATIENT)
Dept: URGENT CARE | Facility: CLINIC | Age: 34
End: 2019-02-12
Payer: COMMERCIAL

## 2019-02-12 VITALS
RESPIRATION RATE: 16 BRPM | TEMPERATURE: 98.4 F | BODY MASS INDEX: 18.96 KG/M2 | OXYGEN SATURATION: 95 % | WEIGHT: 140 LBS | HEART RATE: 62 BPM | DIASTOLIC BLOOD PRESSURE: 80 MMHG | SYSTOLIC BLOOD PRESSURE: 110 MMHG | HEIGHT: 72 IN

## 2019-02-12 DIAGNOSIS — G43.109 MIGRAINE WITH AURA AND WITHOUT STATUS MIGRAINOSUS, NOT INTRACTABLE: ICD-10-CM

## 2019-02-12 PROCEDURE — 99213 OFFICE O/P EST LOW 20 MIN: CPT | Performed by: PHYSICIAN ASSISTANT

## 2019-02-12 ASSESSMENT — ENCOUNTER SYMPTOMS
HEADACHES: 1
PHOTOPHOBIA: 0
MYALGIAS: 1
FEVER: 0
CHILLS: 0
DOUBLE VISION: 0
SHORTNESS OF BREATH: 0
DIZZINESS: 1
PALPITATIONS: 0
COUGH: 0
BLURRED VISION: 0
NAUSEA: 0
SORE THROAT: 0
VOMITING: 0

## 2019-02-13 NOTE — PROGRESS NOTES
"Subjective:      Sidney Culver is a 34 y.o. male who presents with Headache (x 5 days, headaches, dizziness and shaky)      HPI:   Patient presents today for evaluation of migraine with aura for 5 days with what he describes as \"pre-seizure symptoms\".  Patient states that he has been being evaluated by his primary care physician for some time in relation to his migraine headaches.  He said that he first had a migraine when he was in [age] years old but states that in the past 12 months or so they have started to become more frequent.  He says they tried him on Topamax for a time but that did not works they took him off of it.  Patient currently reports that using Imitrex in combination with Midol or with Excedrin is what helps the most with his migraine headaches.  He says that he has had feelings of shakiness and dizziness in association with his migraines in the past but that that typically they only last for a few hours.  He says that his migraine started approximately 5 days ago and for the first 2-3 days pretty consistently he had dizziness and shakiness.  He said he was a little bit concerned because he has not had that for quite as long.  He currently reports mild headache of a 3-4 out of 10 but states that his symptoms of dizziness and shakiness resolved 2 days ago.  He has an appointment to see a neurologist on March 21 but with the change in how long the symptoms lasted he want to be evaluated sooner.  He denies any visual changes, nausea/vomiting, chest pain, shortness of breath, seizures, or loss of consciousness.        Review of Systems   Constitutional: Positive for malaise/fatigue. Negative for chills and fever.   HENT: Negative for congestion and sore throat.    Eyes: Negative for blurred vision, double vision and photophobia.   Respiratory: Negative for cough and shortness of breath.    Cardiovascular: Negative for chest pain and palpitations.   Gastrointestinal: Negative for nausea and " "vomiting.   Musculoskeletal: Positive for myalgias.   Skin: Negative for rash.   Neurological: Positive for dizziness and headaches.       PMH:  has a past medical history of H/O juvenile rheumatoid arthritis (4/2/2018); Migraine with aura and without status migrainosus, not intractable (4/2/2018); Muscle pain, myofascial (11/6/2018); and Nonintractable generalized idiopathic epilepsy without status epilepticus (HCC) (4/2/2018).  MEDS:   Current Outpatient Prescriptions:   •  asa/apap/caffeine (EXCEDRIN) 250-250-65 MG Tab, Take 1 Tab by mouth every 6 hours as needed for Headache., Disp: , Rfl:   •  sumatriptan (IMITREX) 100 MG tablet, Take 1 Tab by mouth Once PRN for Migraine for up to 1 dose., Disp: 18 Tab, Rfl: 3  •  omeprazole (PRILOSEC) 20 MG delayed-release capsule, Take 1 Cap by mouth every day., Disp: 90 Cap, Rfl: 3  •  Diclofenac Sodium 1 % Gel, Apply to painful muscles up to qid, Disp: 200 g, Rfl: 11  •  diclofenac EC (VOLTAREN) 75 MG Tablet Delayed Response, TAKE 1 TABLET BY MOUTH TWICE DAILY, Disp: 180 Tab, Rfl: 3  ALLERGIES:   Allergies   Allergen Reactions   • Acetaminophen    • Other Drug      medrisil     SURGHX: No past surgical history on file.  SOCHX:  reports that he quit smoking about 20 months ago. His smoking use included Cigarettes. He quit after 4.00 years of use. He has never used smokeless tobacco. He reports that he does not drink alcohol or use drugs.  FH: Family history was reviewed, no pertinent findings to report     Objective:     /80 (BP Location: Left arm, Patient Position: Sitting, BP Cuff Size: Adult)   Pulse 62   Temp 36.9 °C (98.4 °F) (Temporal)   Resp 16   Ht 1.829 m (6' 0.01\")   Wt 63.5 kg (140 lb)   SpO2 95%   BMI 18.98 kg/m²      Physical Exam   Constitutional: He is oriented to person, place, and time. He appears well-developed and well-nourished.   HENT:   Head: Normocephalic and atraumatic.   Right Ear: External ear normal.   Left Ear: External ear normal. "   Eyes: Pupils are equal, round, and reactive to light. Conjunctivae and EOM are normal.   Neck: Normal range of motion.   Cardiovascular: Normal rate, regular rhythm and normal heart sounds.    No murmur heard.  Pulmonary/Chest: Effort normal and breath sounds normal. He has no wheezes.   Lymphadenopathy:     He has no cervical adenopathy.   Neurological: He is alert and oriented to person, place, and time. He has normal strength and normal reflexes. No cranial nerve deficit or sensory deficit. He displays a negative Romberg sign. GCS eye subscore is 4. GCS verbal subscore is 5. GCS motor subscore is 6.   Normal Rapid alternating movements, normal gait   Skin: Skin is warm and dry. Capillary refill takes less than 2 seconds.   Psychiatric: He has a normal mood and affect. His behavior is normal. Judgment normal.        Assessment/Plan:     1. Migraine with aura and without status migrainosus, not intractable  -Patient states that he is gradually improving but is just concerned with the increase in his preseizure symptoms.  Neuro exam was completely normal the office today and due to the fact that his preseizure symptoms stopped approximately 2 days ago I advised patient to discontinue with his normal regimen of medications.  However, if his symptoms should return again and change pattern again I advised him to go to the emergency department for evaluation.      Differential Diagnosis, natural history, and supportive care discussed. Return to the Urgent Care or follow up with your PCP if symptoms fail to resolve, or for any new or worsening symptoms. Emergency room precautions discussed. Patient and/or family appears understanding of information.

## 2019-03-04 ENCOUNTER — HOSPITAL ENCOUNTER (EMERGENCY)
Facility: MEDICAL CENTER | Age: 34
End: 2019-03-05
Attending: EMERGENCY MEDICINE
Payer: COMMERCIAL

## 2019-03-04 VITALS
RESPIRATION RATE: 16 BRPM | SYSTOLIC BLOOD PRESSURE: 137 MMHG | TEMPERATURE: 98.2 F | DIASTOLIC BLOOD PRESSURE: 82 MMHG | HEART RATE: 67 BPM | BODY MASS INDEX: 19.55 KG/M2 | OXYGEN SATURATION: 97 % | WEIGHT: 144.18 LBS

## 2019-03-04 DIAGNOSIS — G43.109 MIGRAINE WITH AURA AND WITHOUT STATUS MIGRAINOSUS, NOT INTRACTABLE: ICD-10-CM

## 2019-03-04 PROCEDURE — 99283 EMERGENCY DEPT VISIT LOW MDM: CPT

## 2019-03-05 PROCEDURE — 700102 HCHG RX REV CODE 250 W/ 637 OVERRIDE(OP): Performed by: EMERGENCY MEDICINE

## 2019-03-05 PROCEDURE — A9270 NON-COVERED ITEM OR SERVICE: HCPCS | Performed by: EMERGENCY MEDICINE

## 2019-03-05 RX ADMIN — IBUPROFEN 800 MG: 600 TABLET ORAL at 00:07

## 2019-03-05 NOTE — ED TRIAGE NOTES
Sidney Culver  34 y.o. male  Chief Complaint   Patient presents with   • Headache     Pt. states hx of chronic migraines with hx of one seizure when he was 15 y.o. Pt. states that his migraines recently have given him feelings that were similar to the feeling he had prior to this seizure including auras, muscle spasms, and vertigo. Pt. states accompanying nausea. Pt. states he is on imatrex for his migraines.      /82   Pulse 67   Temp 36.8 °C (98.2 °F) (Temporal)   Resp 16   Wt 65.4 kg (144 lb 2.9 oz)   SpO2 97%   BMI 19.55 kg/m²     Pt amb to triage with steady gait for above complaint.   Pt is alert and oriented, speaking in full sentences, follows commands and responds appropriately to questions. NAD. Resp are even and unlabored.  Pt placed in lobby. Pt educated on triage process. Pt encouraged to alert staff for any changes.

## 2019-03-05 NOTE — ED PROVIDER NOTES
ED Provider Note    CHIEF COMPLAINT  Chief Complaint   Patient presents with   • Headache     Pt. states hx of chronic migraines with hx of one seizure when he was 15 y.o. Pt. states that his migraines recently have given him feelings that were similar to the feeling he had prior to this seizure including auras, muscle spasms, and vertigo. Pt. states accompanying nausea. Pt. states he is on imatrex for his migraines.        HPI  Sidney Culver is a 34 y.o. male who presents with a headache.  Patient states he has pretty frequent migraines.  He states the pain is located in the left temporal region and described as sharp.  He does have photo and phonophobia.  He does not have any associated vomiting.  He states he is concerned this is also developed a lot of RRs with seeing different spots as well as some muscle spasms.  The patient states this is concerning as he states that he has had seizures in the past with similar Nasrin.  The patient has not had any recent fevers.  Does not have any pain with flexion at the neck.  He does not have any weakness to his extremities.    REVIEW OF SYSTEMS  See HPI for further details. All other systems are negative.     PAST MEDICAL HISTORY  Past Medical History:   Diagnosis Date   • Muscle pain, myofascial 11/6/2018   • Migraine with aura and without status migrainosus, not intractable 4/2/2018   • H/O juvenile rheumatoid arthritis 4/2/2018   • Nonintractable generalized idiopathic epilepsy without status epilepticus (HCC) 4/2/2018       FAMILY HISTORY  [unfilled]    SOCIAL HISTORY  Social History     Social History   • Marital status: Single     Spouse name: N/A   • Number of children: N/A   • Years of education: N/A     Social History Main Topics   • Smoking status: Former Smoker     Years: 4.00     Types: Cigarettes     Quit date: 5/28/2017   • Smokeless tobacco: Never Used      Comment: Former Hookah   • Alcohol use No      Comment: Rarely   • Drug use: No   • Sexual  activity: Not Currently     Partners: Male      Comment: single with SO,  at the [employer]     Other Topics Concern   • Not on file     Social History Narrative   • No narrative on file       SURGICAL HISTORY  No past surgical history on file.    CURRENT MEDICATIONS  Home Medications     Reviewed by Faye Cox R.N. (Registered Nurse) on 03/04/19 at 2308  Med List Status: Partial   Medication Last Dose Status   asa/apap/caffeine (EXCEDRIN) 250-250-65 MG Tab  Active   diclofenac EC (VOLTAREN) 75 MG Tablet Delayed Response  Active   Diclofenac Sodium 1 % Gel  Active   omeprazole (PRILOSEC) 20 MG delayed-release capsule  Active   sumatriptan (IMITREX) 100 MG tablet  Active                ALLERGIES  Allergies   Allergen Reactions   • Acetaminophen    • Other Drug      medrisil       PHYSICAL EXAM  VITAL SIGNS: /82   Pulse 67   Temp 36.8 °C (98.2 °F) (Temporal)   Resp 16   Wt 65.4 kg (144 lb 2.9 oz)   SpO2 97%   BMI 19.55 kg/m²  Room air O2: 97    Constitutional: Well developed, Well nourished, No acute distress, Non-toxic appearance.   HENT: Normocephalic, Atraumatic, Bilateral external ears normal, Oropharynx moist, No oral exudates, Nose normal.   Eyes: PERRLA, EOMI, Conjunctiva normal, No discharge.   Neck: Normal range of motion, No tenderness, Supple, No stridor.   Lymphatic: No lymphadenopathy noted.   Cardiovascular: Normal heart rate, Normal rhythm, No murmurs, No rubs, No gallops.   Thorax & Lungs: Normal breath sounds, No respiratory distress, No wheezing, No chest tenderness.   Abdomen: Bowel sounds normal, Soft, No tenderness, No masses, No pulsatile masses.   Skin: Warm, Dry, No erythema, No rash.   Back: No tenderness, No CVA tenderness.   Extremities: Intact distal pulses, No edema, No tenderness, No cyanosis, No clubbing.   Musculoskeletal: Good range of motion in all major joints. No tenderness to palpation or major deformities noted.   Neurologic: Alert & oriented x  3, Normal motor function, Normal sensory function, No focal deficits noted.   Psychiatric: Affect normal, Judgment normal, Mood normal.     COURSE & MEDICAL DECISION MAKING  Pertinent Labs & Imaging studies reviewed. (See chart for details)  This a 34-year-old male who presents the emerge department with a headache.  His history is consistent with a complex migraine.  My concern is the patient's taken tripped and is not having some neurologic symptoms.  Therefore he will stop the triptan's and will go to a magnesium, Motrin, caffeine, and hydration.  If this is not effective the patient will add Benadryl to the regimen.  He does not appear toxic.  He does not have any meningeal findings.  Clinically do not appreciate any evidence of an infectious process.  The patient's pain is mild to moderate in intensity at this time.  We will give the patient Motrin and have him go home and take Benadryl.  He will contact his neurologist tomorrow for further management and treatment.  He will return to the emerge department is acutely worse.    FINAL IMPRESSION  1.  Complex migraine       Electronically signed by: Jarvis Delcid, 3/5/2019 12:03 AM

## 2019-03-05 NOTE — ED NOTES
Pt offered resources for depression or to speak with alert team therapist while here for current chief complaint based on columbia scale screening, pt declined resources or wanting to speak to anyone regarding this matter. Pt educated on the importance of resources for depression, pt continues to decline.

## 2019-03-05 NOTE — ED NOTES
Pt discharge to home.  Pt provided with discharge instruction.  Pt verbalized understanding, all questions answered.  VSS upon DC. Pt steady on feet upon DC.

## 2019-03-07 ENCOUNTER — HOSPITAL ENCOUNTER (EMERGENCY)
Facility: MEDICAL CENTER | Age: 34
End: 2019-03-08
Attending: EMERGENCY MEDICINE
Payer: COMMERCIAL

## 2019-03-07 DIAGNOSIS — G44.89 OTHER HEADACHE SYNDROME: ICD-10-CM

## 2019-03-07 PROCEDURE — 99284 EMERGENCY DEPT VISIT MOD MDM: CPT

## 2019-03-08 ENCOUNTER — APPOINTMENT (OUTPATIENT)
Dept: RADIOLOGY | Facility: MEDICAL CENTER | Age: 34
End: 2019-03-08
Attending: EMERGENCY MEDICINE
Payer: COMMERCIAL

## 2019-03-08 VITALS
HEIGHT: 72 IN | WEIGHT: 136.69 LBS | SYSTOLIC BLOOD PRESSURE: 137 MMHG | OXYGEN SATURATION: 95 % | HEART RATE: 61 BPM | BODY MASS INDEX: 18.51 KG/M2 | RESPIRATION RATE: 18 BRPM | DIASTOLIC BLOOD PRESSURE: 77 MMHG | TEMPERATURE: 98.7 F

## 2019-03-08 PROCEDURE — 700102 HCHG RX REV CODE 250 W/ 637 OVERRIDE(OP)

## 2019-03-08 PROCEDURE — 70450 CT HEAD/BRAIN W/O DYE: CPT

## 2019-03-08 PROCEDURE — A9270 NON-COVERED ITEM OR SERVICE: HCPCS

## 2019-03-08 RX ADMIN — Medication 400 MG: at 00:35

## 2019-03-08 NOTE — ED NOTES
"Pt reports HA is unchanged, muscle \" twitches\" in extremities has improved from time of arrival. Pt's VS stable. Pt ok with pak bed.  "

## 2019-03-08 NOTE — ED NOTES
Pt presents complaining of chronic headaches with recurring exacerbations of pain.  These episodes are also accompanied by transitory tremors, at times.   Chief Complaint   Patient presents with   • Headache     /97   Pulse 68   Temp 37.1 °C (98.7 °F) (Temporal)   Resp 18   Ht 1.829 m (6')   Wt 62 kg (136 lb 11 oz)   SpO2 98%   BMI 18.54 kg/m²

## 2019-03-08 NOTE — DISCHARGE INSTRUCTIONS
Take 400 mg of magnesium daily    When your headache starts take 400 mg of magnesium, 800 mg of Advil, drink something with caffeine in it (coffee/tea), drink something with sugar in it (Gatorade or sugar in coffee) rest in a dark room and repeat medications in 4 hours if no improvement

## 2019-03-08 NOTE — ED PROVIDER NOTES
ED Provider Note    CHIEF COMPLAINT  Chief Complaint   Patient presents with   • Headache       HPI  Sidney Culver is a 34 y.o. male who presents complaining of accelerating migraine headaches.  The patient states that he has been having worsening migraine headaches for years.  He states he is getting some muscle twitching along with the pain in his head now.  He has an appointment to see a neurologist on the 20th of this month however he is becoming more nervous because his headaches are becoming more frequent.  He went to the urgent care last week and they told him to present to the ER if his symptoms worsen.  He was seen in the emergency department 3 days ago and given Motrin and told to take magnesium, caffeine and keep his follow-up point with neuro.  Patient is here today again because of his continued headaches.  He states he gets occasional twitching in his muscles.  He denies any neck fevers or chills sore throat cough or cold symptoms.  Is requesting a CT scan of the head to make sure there is nothing else going on.  He denies any smoking alcohol or drug use.    REVIEW OF SYSTEMS  HEENT:  No ear pain, congestion or sore throat   EYES: no discharge redness or vision changes  CARDIAC: no chest pain, palpitations    PULMONARY: no dyspnea, cough or congestion   GI: no vomiting diarrhea or abdominal pain   : no dysuria, back pain or hematuria   Neuro: no weakness, numbness aphasia positive headache positive muscle twitching  Musculoskeletal: no swelling deformity or pain no joint swelling  Endocrine: no fevers, sweating, weight loss   SKIN: no rash, erythema or contusions     See history of present illness all other systems are negative      PAST MEDICAL HISTORY  Past Medical History:   Diagnosis Date   • Muscle pain, myofascial 11/6/2018   • Migraine with aura and without status migrainosus, not intractable 4/2/2018   • H/O juvenile rheumatoid arthritis 4/2/2018   • Nonintractable generalized  idiopathic epilepsy without status epilepticus (HCC) 4/2/2018       FAMILY HISTORY  Family History   Problem Relation Age of Onset   • Lung Disease Mother         COPD in a smoker   • Other Mother         epilepsy   • Arthritis Mother        SOCIAL HISTORY  Social History     Social History   • Marital status: Single     Spouse name: N/A   • Number of children: N/A   • Years of education: N/A     Social History Main Topics   • Smoking status: Former Smoker     Years: 4.00     Types: Cigarettes     Quit date: 5/28/2017   • Smokeless tobacco: Never Used      Comment: Former Hookah   • Alcohol use No      Comment: Rarely   • Drug use: No   • Sexual activity: Not Currently     Partners: Male      Comment: single with SO,  at the [employer]     Other Topics Concern   • Not on file     Social History Narrative   • No narrative on file       SURGICAL HISTORY  No past surgical history on file.    CURRENT MEDICATIONS  Home Medications     Reviewed by Rony Angulo R.N. (Registered Nurse) on 03/07/19 at 1912  Med List Status: Partial   Medication Last Dose Status   asa/apap/caffeine (EXCEDRIN) 250-250-65 MG Tab 3/7/2019 Active   diclofenac EC (VOLTAREN) 75 MG Tablet Delayed Response 3/7/2019 Active   Diclofenac Sodium 1 % Gel PRN Active   omeprazole (PRILOSEC) 20 MG delayed-release capsule 3/7/2019 Active   sumatriptan (IMITREX) 100 MG tablet PRN Active                ALLERGIES  Allergies   Allergen Reactions   • Acetaminophen    • Other Drug      medrisil       PHYSICAL EXAM  VITAL SIGNS: /77   Pulse 61   Temp 37.1 °C (98.7 °F) (Temporal)   Resp 18   Ht 1.829 m (6')   Wt 62 kg (136 lb 11 oz)   SpO2 95%   BMI 18.54 kg/m²  Room air O2: 100    Constitutional:  Well developed, Well nourished, No acute distress, Non-toxic appearance.   HENT: Normocephalic atraumatic pharynx pink mucous membranes are moist TMs are clear  Eyes: PERRLA, EOMI, Conjunctiva normal, No discharge.   Neck: Normal range of  motion, No tenderness, Supple, No stridor.   Lymphatic: No lymphadenopathy noted.   Cardiovascular: Normal heart rate, Normal rhythm, No murmurs, No rubs, No gallops.   Thorax & Lungs: Normal breath sounds, No respiratory distress, No wheezing, No chest tenderness.   Skin: Warm, Dry, No erythema, No rash.   Extremities: Intact distal pulses, No edema, No tenderness, No cyanosis, No clubbing.   Neurologic: Alert & oriented x 3, Normal motor function, Normal sensory function, No focal deficits noted.  NIH is 0  Psychiatric: Affect normal, Judgment normal, Mood normal.     RADIOLOGY/PROCEDURES  CT-HEAD W/O   Final Result         1.  No acute intracranial abnormality.            COURSE & MEDICAL DECISION MAKING  Pertinent Labs & Imaging studies reviewed. (See chart for details)  Differential diagnosis: Atypical migraine versus petite mall seizures     I gave the patient on oral magnesium orally.  His CT head is normal.  He will be discharged home and is to keep his follow-up with neurology.  I reiterated the migraine treatments prescribed by neurology normally which include magnesium, caffeine, fluids, dextrose, Motrin and rest.  The patient understands this and will be discharged home in stable condition.      Adelina Pearce M.D.  [address]  [address]  [facility] [address] [phone]    Call in 1 day  for recheck    neurology      keep appointment this month      Current Outpatient Prescriptions   Medication Sig Dispense Refill   • asa/apap/caffeine (EXCEDRIN) 250-250-65 MG Tab Take 1 Tab by mouth every 6 hours as needed for Headache.     • omeprazole (PRILOSEC) 20 MG delayed-release capsule Take 1 Cap by mouth every day. 90 Cap 3   • Diclofenac Sodium 1 % Gel Apply to painful muscles up to qid 200 g 11   • diclofenac EC (VOLTAREN) 75 MG Tablet Delayed Response TAKE 1 TABLET BY MOUTH TWICE DAILY 180 Tab 3   • sumatriptan (IMITREX) 100 MG tablet Take 1 Tab by mouth Once PRN for Migraine for up to 1 dose. 18  Tab 3       FINAL IMPRESSION  1. Other headache syndrome            Electronically signed by: Diamond Pack, 3/7/2019 11:47 PM

## 2019-03-08 NOTE — ED NOTES
Pt discharged in good condition with instructions on migraine control and reasons to return for re-check. Pt will follow up with neurology. Ambulates out with friend.

## 2019-03-13 ENCOUNTER — OFFICE VISIT (OUTPATIENT)
Dept: MEDICAL GROUP | Facility: MEDICAL CENTER | Age: 34
End: 2019-03-13
Payer: COMMERCIAL

## 2019-03-13 VITALS
OXYGEN SATURATION: 95 % | RESPIRATION RATE: 14 BRPM | DIASTOLIC BLOOD PRESSURE: 80 MMHG | SYSTOLIC BLOOD PRESSURE: 100 MMHG | WEIGHT: 137 LBS | BODY MASS INDEX: 18.56 KG/M2 | HEART RATE: 56 BPM | TEMPERATURE: 98.2 F | HEIGHT: 72 IN

## 2019-03-13 DIAGNOSIS — F33.40 RECURRENT MAJOR DEPRESSIVE DISORDER, IN REMISSION (HCC): ICD-10-CM

## 2019-03-13 DIAGNOSIS — G43.109 MIGRAINE WITH AURA AND WITHOUT STATUS MIGRAINOSUS, NOT INTRACTABLE: ICD-10-CM

## 2019-03-13 PROCEDURE — 99214 OFFICE O/P EST MOD 30 MIN: CPT | Performed by: FAMILY MEDICINE

## 2019-03-13 RX ORDER — THIAMINE HCL 100 MG
TABLET ORAL
Qty: 100 TAB | Refills: 3 | COMMUNITY
Start: 2019-03-13

## 2019-03-13 ASSESSMENT — PATIENT HEALTH QUESTIONNAIRE - PHQ9
7. TROUBLE CONCENTRATING ON THINGS, SUCH AS READING THE NEWSPAPER OR WATCHING TELEVISION: 1
6. FEELING BAD ABOUT YOURSELF - OR THAT YOU ARE A FAILURE OR HAVE LET YOURSELF OR YOUR FAMILY DOWN: 2
5. POOR APPETITE OR OVEREATING: 0
8. MOVING OR SPEAKING SO SLOWLY THAT OTHER PEOPLE COULD HAVE NOTICED. OR THE OPPOSITE, BEING SO FIGETY OR RESTLESS THAT YOU HAVE BEEN MOVING AROUND A LOT MORE THAN USUAL: 2
1. LITTLE INTEREST OR PLEASURE IN DOING THINGS: 2
9. THOUGHTS THAT YOU WOULD BE BETTER OFF DEAD, OR OF HURTING YOURSELF: 1
SUM OF ALL RESPONSES TO PHQ9 QUESTIONS 1 AND 2: 4
4. FEELING TIRED OR HAVING LITTLE ENERGY: 3
2. FEELING DOWN, DEPRESSED, IRRITABLE, OR HOPELESS: 2
SUM OF ALL RESPONSES TO PHQ QUESTIONS 1-9: 15
3. TROUBLE FALLING OR STAYING ASLEEP OR SLEEPING TOO MUCH: 2

## 2019-03-13 NOTE — ASSESSMENT & PLAN NOTE
This is a new problem for this patient that actually is recurrent.  Patient states that he can recall being depressed and actually having suicidal thoughts as early as age 9.  He is totally able to deal with his feelings.  His PHQ 9 comes out with a score of 15.  Patient does not wish to be on medication.  Patient is currently suffering with complex migraines and dealing with some hassles trying to get FMLA paperwork completed and continuing to work.  All of that has made this a little bit more intense at this time.  Patient is able to contract for his own safety and does not currently have suicidal or homicidal ideation.  Depression Screen (PHQ-2/PHQ-9) 4/2/2018 3/13/2019   PHQ-2 Total Score - 4   PHQ-2 Total Score 0 -   PHQ-9 Total Score - 15       Interpretation of PHQ-9 Total Score   Score Severity   1-4 No Depression   5-9 Mild Depression   10-14 Moderate Depression   15-19 Moderately Severe Depression   20-27 Severe Depression

## 2019-03-13 NOTE — ASSESSMENT & PLAN NOTE
This is a chronic health problem for this patient that has changed since about the end of January beginning of February 2019.  Patient is still getting migraines but now gets a complex set of symptoms with it as well including blind spots in his peripheral vision, feeling overheated and then muscle spasms.  He is been seen in the emergency room twice so far in March 2019 because of these problems.  He also needs to have some paperwork completed because he is having to miss time at work because of these problems.  He is not able to get into neurology until 3/21/2019.  I am hopeful that we will be able to get him medication that will work better for preventing migraines than what he currently is taking.  Patient has seen improvement in his migraine pain with adding magnesium

## 2019-03-13 NOTE — PROGRESS NOTES
CC:Diagnoses of Migraine with aura and without status migrainosus, not intractable and Recurrent major depressive disorder, in remission (HCC) were pertinent to this visit.    HISTORY OF PRESENT ILLNESS: Patient is a 34 y.o. male established patient who presents today to discuss his migraine headaches, complete a PHQ 9 and get FMLA paperwork completed for his employer.    Health Maintenance: Completed    Migraine with aura and without status migrainosus, not intractable  This is a chronic health problem for this patient that has changed since about the end of January beginning of February 2019.  Patient is still getting migraines but now gets a complex set of symptoms with it as well including blind spots in his peripheral vision, feeling overheated and then muscle spasms.  He is been seen in the emergency room twice so far in March 2019 because of these problems.  He also needs to have some paperwork completed because he is having to miss time at work because of these problems.  He is not able to get into neurology until 3/21/2019.  I am hopeful that we will be able to get him medication that will work better for preventing migraines than what he currently is taking.  Patient has seen improvement in his migraine pain with adding magnesium    Recurrent major depressive disorder, in remission (HCC)  This is a new problem for this patient that actually is recurrent.  Patient states that he can recall being depressed and actually having suicidal thoughts as early as age 9.  He is totally able to deal with his feelings.  His PHQ 9 comes out with a score of 15.  Patient does not wish to be on medication.  Patient is currently suffering with complex migraines and dealing with some hassles trying to get FMLA paperwork completed and continuing to work.  All of that has made this a little bit more intense at this time.  Patient is able to contract for his own safety and does not currently have suicidal or homicidal  ideation.  Depression Screen (PHQ-2/PHQ-9) 4/2/2018 3/13/2019   PHQ-2 Total Score - 4   PHQ-2 Total Score 0 -   PHQ-9 Total Score - 15       Interpretation of PHQ-9 Total Score   Score Severity   1-4 No Depression   5-9 Mild Depression   10-14 Moderate Depression   15-19 Moderately Severe Depression   20-27 Severe Depression        Patient Active Problem List    Diagnosis Date Noted   • Recurrent major depressive disorder, in remission ([facility]) 03/13/2019   • Muscle pain, myofascial 11/06/2018   • Migraine with aura and without status migrainosus, not intractable 04/02/2018   • H/O juvenile rheumatoid arthritis 04/02/2018   • Nonintractable generalized idiopathic epilepsy without status epilepticus (HCC) 04/02/2018      Allergies:Acetaminophen and Other drug    Current Outpatient Prescriptions   Medication Sig Dispense Refill   • Magnesium 500 MG Tab Taking 1 daily and then 1 with the onset of a migraine. 100 Tab 3   • asa/apap/caffeine (EXCEDRIN) 250-250-65 MG Tab Take 1 Tab by mouth every 6 hours as needed for Headache.     • omeprazole (PRILOSEC) 20 MG delayed-release capsule Take 1 Cap by mouth every day. 90 Cap 3   • Diclofenac Sodium 1 % Gel Apply to painful muscles up to qid 200 g 11   • diclofenac EC (VOLTAREN) 75 MG Tablet Delayed Response TAKE 1 TABLET BY MOUTH TWICE DAILY 180 Tab 3   • sumatriptan (IMITREX) 100 MG tablet Take 1 Tab by mouth Once PRN for Migraine for up to 1 dose. 18 Tab 3     No current facility-administered medications for this visit.        Social History   Substance Use Topics   • Smoking status: Former Smoker     Years: 4.00     Types: Cigarettes     Quit date: 5/28/2017   • Smokeless tobacco: Never Used      Comment: Former Hookah   • Alcohol use No      Comment: Rarely     Social History     Social History Narrative   • No narrative on file       Family History   Problem Relation Age of Onset   • Lung Disease Mother         COPD in a smoker   • Other Mother         epilepsy   •  "Arthritis Mother         ROS:     - Constitutional:  Negative for fever, chills, unexpected weight change, and fatigue/generalized weakness.    - HEENT:  Negative for headaches, vision changes, hearing changes, ear pain, ear discharge, rhinorrhea, sinus congestion, sore throat, and neck pain.      - Respiratory: Negative for cough, sputum production, chest congestion, dyspnea, wheezing, and crackles.      - Cardiovascular: Negative for chest pain, palpitations, orthopnea, and bilateral lower extremity edema.     - Gastrointestinal: Negative for heartburn, nausea, vomiting, abdominal pain, hematochezia, melena, diarrhea, constipation, and greasy/foul-smelling stools.     - Genitourinary: Negative for dysuria, polyuria, hematuria, pyuria, urinary urgency, and urinary incontinence.     - Musculoskeletal: Negative for myalgias, back pain, and joint pain.     - Skin: Negative for rash, itching, cyanotic skin color change.     - Neurological:positive for complex migraines worsening since 1/2019.  Denies dizziness, tingling or tremors.     - Endo/Heme/Allergies: Does not bruise/bleed easily.     - Psychiatric/Behavioral:positive for depression without suicidal or homicidal ideation.  Denies memoryloss. suicidal/homicidal ideation and memory loss.              Exam:    Blood pressure 100/80, pulse (!) 56, temperature 36.8 °C (98.2 °F), temperature source Temporal, resp. rate 14, height 1.829 m (6' 0.01\"), weight 62.1 kg (137 lb), SpO2 95 %. Body mass index is 18.58 kg/m².    General:  Well nourished, well developed male in NAD  Head is grossly normal.  Neck: Supple without JVD or bruit. Thyroid is not enlarged.  Pulmonary: Clear to ausculation and percussion.  Normal effort. No rales, ronchi, or wheezing.  Cardiovascular: Regular rate and rhythm without murmur. Carotid and radial pulses are intact and equal bilaterally.  Extremities: no clubbing, cyanosis, or edema.  Patient was seen for 25 minutes face to face of which, 25 " minutes was spent counseling regarding completion of FMLA paperwork and discussion of depression symptoms and treatments..    Please note that this dictation was created using voice recognition software. I have made every reasonable attempt to correct obvious errors, but I expect that there are errors of grammar and possibly content that I did not discover before finalizing the note.    Assessment/Plan:  1. Migraine with aura and without status migrainosus, not intractable  Uncontrolled, patient was provided with intermittent FMLA with each occurrence being up to 2 days and having 4 occurrences per 30 days.  Patient will be seeing Dr. Breonna Galarza in the coming week and will be able to get clarification as to whether or not this is just complex migraines or if his epilepsy is starting to manifest once again.    2. Recurrent major depressive disorder, in remission (HCC)  Adequately controlled with lifestyle and patient's own thought processes.  We will continue to monitor.  Patient able to contract for safety.

## 2019-03-21 ENCOUNTER — OFFICE VISIT (OUTPATIENT)
Dept: NEUROLOGY | Facility: MEDICAL CENTER | Age: 34
End: 2019-03-21
Payer: COMMERCIAL

## 2019-03-21 VITALS
OXYGEN SATURATION: 97 % | RESPIRATION RATE: 16 BRPM | HEART RATE: 67 BPM | HEIGHT: 73 IN | DIASTOLIC BLOOD PRESSURE: 58 MMHG | SYSTOLIC BLOOD PRESSURE: 100 MMHG | BODY MASS INDEX: 18.42 KG/M2 | TEMPERATURE: 98.4 F | WEIGHT: 139 LBS

## 2019-03-21 DIAGNOSIS — G43.719 INTRACTABLE CHRONIC MIGRAINE WITHOUT AURA AND WITHOUT STATUS MIGRAINOSUS: ICD-10-CM

## 2019-03-21 DIAGNOSIS — Z86.69 HISTORY OF EPILEPSY: ICD-10-CM

## 2019-03-21 DIAGNOSIS — R40.4 NONSPECIFIC PAROXYSMAL SPELL: ICD-10-CM

## 2019-03-21 PROCEDURE — 99205 OFFICE O/P NEW HI 60 MIN: CPT | Performed by: PSYCHIATRY & NEUROLOGY

## 2019-03-21 RX ORDER — DIVALPROEX SODIUM 500 MG/1
500 TABLET, DELAYED RELEASE ORAL 3 TIMES DAILY
Qty: 60 TAB | Refills: 5 | Status: SHIPPED | OUTPATIENT
Start: 2019-03-21 | End: 2019-04-01 | Stop reason: SDUPTHER

## 2019-03-21 ASSESSMENT — ENCOUNTER SYMPTOMS
FALLS: 0
FEVER: 0
SORE THROAT: 0
ABDOMINAL PAIN: 0
SHORTNESS OF BREATH: 0
WEIGHT LOSS: 0
BRUISES/BLEEDS EASILY: 0
HALLUCINATIONS: 0
EYE DISCHARGE: 0

## 2019-03-21 NOTE — PROGRESS NOTES
"Chief Complaint   Patient presents with   • New Patient     Migraines     Patient is referred by Adelina Pearce M.D. for initial consult.    History of present illness:  Sidney Culver 34 y.o. male presents today for migraines.   History is obtained from patient.  and Patient is accompanied by self.    Duration/timing: see below  Context:   Migraines: Age 25 onset, with gradual onset. Originally 1-2x every few months. Within the last 11/2018 he started having almost daily headaches - 28/30 migraine days a month. Duration of up to 72 hours. 1/2019 he started have vertigo/feeling warm/blind periphery (binocular)/shaky/weak in the setting of a headache lasting for 2 hours (moderate to severe HA pain) until pain improved with taking medication. 2/2019 he had similar as above with 1-2 muscle spasm or jerk in the arm/leg (not uncontrolled) and random wandering short-lived pains \"that make no sense\" like tongue or toes. Once he had these symptoms without a headache followed by mild headache.     Epilepsy: One recorded seizure at age 15 (was found on the ground by sister seizing) after recurrent fainting spells. EEG was abnormal in the past. Diagnosed with \"mild form of epilepsy\".  This episode he felt hot/faint/periphery blindness. No MRI performed.   Location: starting in the posterior occiput through top of head then behind one of his eyes  Quality: spike behind the eyes  Severity: moderate to severe   Modifying factors: worse with activity  Associated signs/symptoms: nausea, occasionally seeing things move randomly (rare), worsening anxiety due to work  Denies: bladder incontinence and bowel incontinence, light and noise sensitivity     Patient has tried:  -Excedrin/motirin - see below, within last 3 months taking; 15+ days a month   -Sumatriptan - 100mg PRN, 50% aborting headache with decrease efficacy now, taken with midol/excedrin with improve efficacy, taken appropriately, caused lightheadedness (mild) " "- was taking 10 pills within 14 days  -Topiramate - taken for few months, daily dose 100mg at night for at least multiple months with intermittent efficacy? He had irregular migraines - caused \"little to no emotion\" on the medication, less verbal and made depression worse, suicidal thoughts. Stopped 10/2018.   -Took an AED for a month  -Acetominophen causes mild flushing and itchiness      Past medical history:   Past Medical History:   Diagnosis Date   • H/O juvenile rheumatoid arthritis 4/2/2018   • Migraine with aura and without status migrainosus, not intractable 4/2/2018   • Muscle pain, myofascial 11/6/2018   • Nonintractable generalized idiopathic epilepsy without status epilepticus (HCC) 4/2/2018   • Recurrent major depressive disorder, in remission (HCC) 3/13/2019       Past surgical history:   History reviewed. No pertinent surgical history.    Family history:   Family History   Problem Relation Age of Onset   • Lung Disease Mother         COPD in a smoker   • Other Mother         epilepsy (late 30s), migraines    • Arthritis Mother    • No Known Problems Sister        Social history:   Social History   • Marital status: Single     Social History Main Topics   • Smoking status: Former Smoker     Years: 4.00     Types: Cigarettes     Quit date: 5/28/2017   • Smokeless tobacco: Never Used      Comment: Former Hookah   • Alcohol use No      Comment: Rarely   • Drug use: No   • Sexual activity: Not Currently     Partners: Male      Comment: single with [employer],  at the [employer]     Current medications:   Current Outpatient Prescriptions   Medication   • Magnesium 500 MG Tab   • asa/apap/caffeine (EXCEDRIN) 250-250-65 MG Tab   • omeprazole (PRILOSEC) 20 MG delayed-release capsule   • Diclofenac Sodium 1 % Gel   • diclofenac EC (VOLTAREN) 75 MG Tablet Delayed Response   • sumatriptan (IMITREX) 100 MG tablet     No current facility-administered medications for this visit.        Medication " "Allergy:  Allergies   Allergen Reactions   • Acetaminophen    • Other Drug      medrisil       Review of Systems   Constitutional: Negative for fever and weight loss.   HENT: Negative for sore throat.    Eyes: Negative for discharge.   Respiratory: Negative for shortness of breath.    Cardiovascular: Negative for leg swelling.   Gastrointestinal: Negative for abdominal pain.   Genitourinary: Negative for dysuria.   Musculoskeletal: Negative for falls.   Skin: Negative for rash.   Neurological:        As per HPI   Endo/Heme/Allergies: Does not bruise/bleed easily.   Psychiatric/Behavioral: Negative for hallucinations.       Physical examination:   Vitals:    03/21/19 0758   BP: (!) 80/50   BP Location: Left arm   Patient Position: Sitting   BP Cuff Size: Adult   Pulse: 67   Resp: 16   Temp: 36.9 °C (98.4 °F)   TempSrc: Temporal   SpO2: 97%   Weight: 63 kg (139 lb)   Height: 1.854 m (6' 1\")   Repeat systolic blood pressure was in the 100s.    General: Patient is thin in no apparent distress.  Eyes: Ophthalmoscopic examination of the optic discs and posterior elements reveals sharp disk margins, normal vessels and pigmentation bilateral.  HENT: Normocephalic, atraumatic. Mallapatic score 2  Cardiovascular: No lower extremity edema.  Respiratory: Normal respiratory effort.   Skin: No appreciable signs of acute rashes or bruising.   Musculoskeletal: No signs of joint or muscle swelling.  Limited range of motion of the bilateral wrists.  He has deformity at the MCP joints bilaterally.  Psychiatric: Pleasant.     NEUROLOGICAL EXAM:   Mental status: Awake, alert and fully oriented to person, place, time and situation. Normal attention, concentration and fund of knowledge for education level.   Speech and language: Speech is fluent without errors.  Cranial nerve exam:  II: Pupils are equally round and reactive to light. Visual fields are intact by confrontation.  III, IV, VI: EOMI, no diplopia, no ptosis.  V: Sensation to " light touch is normal over V1-3 distributions bilaterally.  .  VII: Facial movements are symmetrical. There is no facial droop. .  VIII: Hearing intact to soft speech and finger rub bilaterally  IX: Palate elevates symmetrically, uvula is midline. Dysarthria is not present.  XI: Shoulder shrug are symmetrical and strong.   XII: Tongue protrudes midline.    Motor exam:  Muscle tone is normal in all 4 limbs.  No abnormal movements.    Muscle strength:     Right  Left  Deltoid   5/5  5/5      Biceps   5/5  5/5  Triceps  5/5  5/5   Wrist extensors fixed/5  fixed/5  Wrist flexors  fixed/5  fixed/5     limited/5 limited/5  Interossei  NT/5  NT/5  Thenar (APB)  NT/5  NT/5   Hip flexors  5/5  5/5  Quadriceps  5/5  5/5    Hamstrings  5/5  5/5  Dorsiflexors  5/5  5/5  Plantarflexors  5/5  5/5  Toe extension  NT/5  NT/5  NT = not tested    Sensory exam:  Intact to Light touch, Pinprick, Temperature and Vibration in bilateral upper and lower extremity.    Deep tendon reflexes:       Right  Left  Biceps   trace/4  trace/4  Triceps  trace/4  trace/4  Brachioradialis trace/4  trace/4  Knee jerk  2/4  2/4  Ankle jerk  0/4  0/4   bilateral toes are mute to plantar stimulation..    Coordination: shows a normal finger-nose-finger and heel to shin bilaterally.   Gait: Casual gait is normal.      ANCILLARY DATA REVIEWED:   Lab Data Review:  Lab Results   Component Value Date/Time    WBC 5.2 11/07/2018 04:00 PM    RBC 4.70 11/07/2018 04:00 PM    HEMOGLOBIN 14.9 11/07/2018 04:00 PM    HEMATOCRIT 45.3 11/07/2018 04:00 PM    MCV 96.4 11/07/2018 04:00 PM    MCH 31.7 11/07/2018 04:00 PM    MCHC 32.9 (L) 11/07/2018 04:00 PM    MPV 9.9 11/07/2018 04:00 PM    NEUTSPOLYS 62.80 11/07/2018 04:00 PM    LYMPHOCYTES 24.40 11/07/2018 04:00 PM    MONOCYTES 8.90 11/07/2018 04:00 PM    EOSINOPHILS 2.50 11/07/2018 04:00 PM    BASOPHILS 1.20 11/07/2018 04:00 PM      Lab Results   Component Value Date/Time    SODIUM 140 11/07/2018 04:00 PM    POTASSIUM  4.0 11/07/2018 04:00 PM    CHLORIDE 106 11/07/2018 04:00 PM    CO2 28 11/07/2018 04:00 PM    GLUCOSE 83 11/07/2018 04:00 PM    BUN 14 11/07/2018 04:00 PM    CREATININE 0.85 11/07/2018 04:00 PM       Lab Results  Component Value Date/Time   ASTSGOT 17 11/07/2018 1600   ALTSGPT 14 11/07/2018 1600   ALKPHOSPHAT 63 11/07/2018 1600   ALBUMIN 4.2 11/07/2018 1600       Imaging:   CT head without contrast: 3/2019 1.  No acute intracranial abnormality.  Records reviewed: Patient is sent by Dr. Pearce who are thoroughly documented his headaches.  Patient has a history of idiopathic epilepsy as well that has since resolved.  Patient is following for intermittent FMLA due to the frequency of his migraines.  There is a concern for possible complex migraine versus recurrence of epilepsy.  He does have a history of recurrent major depressive disorder currently controlled as of March 2019.    Patient was in the emergency room in March 2019 for accelerating migraine headaches.  Worsening migraines for over a years.  He was told to take magnesium, Motrin, caffeine for headaches progress.        ASSESSMENT AND PLAN:    1. Intractable chronic migraine without aura and without status migrainosus: Patient's current episodes are likely intractable migraine with a component of medication overuse headache due to the high frequency of his over-the-counter medication use as well as triptan use that has since decreased in efficacy.  He does not snore, no focal exam findings, no other red flag symptoms to suggest secondary etiology.  There may be a component of somatization due to his worsening anxiety at work as well contributing.  Is not uncommon to have dizziness trouble concentrating and other symptoms associated with a severe migraine.  He does not have any hemiplegic or trigeminal cephalalgia type qualities to his headaches.  The suspicion for these prodromes are less likely seizures but given his history is warranting further workup  to ensure there is no focal abnormality or change.  Due to the frequency of his headaches and goals to wean abortive medications, he is a good candidate for preventative therapy.  Topiramate caused suicidal ideation.  Patient's blood pressure may not be able to handle propranolol.  Depakote may be a mood stabilizer and AED.   - MR-BRAIN-WITH & W/O; Future  - REFERRAL TO NEURODIAGNOSTICS (EEG,EP,EMG/NCS/DBS) Modality Requested: EEG-Video  - CBC WITH DIFFERENTIAL; Future  - Comp Metabolic Panel; Future  - divalproex (DEPAKOTE) 500 MG Tablet Delayed Response; Take 1 Tab by mouth 3 times a day.  Dispense: 60 Tab; Refill: 5  -Headache diary    2. History of epilepsy: Self-reported history of epilepsy not requiring AEDs at age 15.  Reportedly abnormal EEG, and no MRI brain performed.  He reportedly saw a neurologist.  Seems a bit unusual to have seizures without MRI specially after seeing neurology.    3. Nonspecific paroxysmal spell: Suspect related to headache.  Workup as above    4. Depression/anxiety: Patient reports stable depression without HI or SI but worsened anxiety secondary to work.  We discussed behavioral modifications as well as medications and therapy.  Currently he will continue to meditate.  He will seek additional help if needed.    5. Hx of juvenile RA: In remission since teenage years, not on medications    FOLLOW-UP: Return in about 3 months (around 6/21/2019).  EDUCATION AND COUNSELING:  -I personally discussed the following with the patient:   Risks/benefits/side effects/alternatives of medication including but not limited to drowsiness, sedation, dizziness, increased risk for falls, hypersensitivity reactions including rash (which may be fatal), weight changes, GI side effects (gastritis, ulcers, bleeding, changes in appetite, pancreatitis, change in bowel habits), liver dysfunction, increased risk for bleeding, increased risk for depression, anxiety, suicide, psychosis and mood changes and avoid  abrupt cessation of medication., Diagnosis, prognosis, and treatment options discussed with patient at length.  , Discussed healthy lifestyle, including: healthy diet (rich in fruits, vegetables, nuts and healthy oils); proper hydration, and adequate sleep hygiene (allowing 7-8 hrs of overnight sleep)., Recommend regular exercise, proper hydration, healthy diet and stress reduction.  and Recommend improvement of sleep hygiene, including a structured schedule. Allow 7-8 hrs of overnight sleep. Avoid daytime naps. Discussed medication overuse headaches.    The patient understands and agrees that due to the complexity of his/her diagnosis, results of any testing and further recommendations will typically be discussed/made during a face to face encounter in my office. The patient and/or family further understands it is their responsibility to keep proper follow up.     Disclaimer  This dictation was created using voice recognition software. I have made every reasonable attempt to avoid dictation errors, but this document may contain an error not identified before finalizing. If the error changes the accuracy of the document, I would appreciate it being brought to my attention. Thank you very much.     Breonna Galarza MD  Neurology, Neurophysiology  [facility]

## 2019-03-21 NOTE — LETTER
"     [facility]   [address], [address]  [place] [address]  Phone: [phone]  Fax: [phone]              Sidney Culver  1985    Encounter Date: 3/21/2019    Breonna Galarza M.D.          PROGRESS NOTE:  Chief Complaint   Patient presents with   • New Patient     Migraines     Patient is referred by Adelina Pearce M.D. for initial consult.    History of present illness:  Sidney Culver 34 y.o. male presents today for migraines.   History is obtained from patient.  and Patient is accompanied by self.    Duration/timing: see below  Context:   Migraines: Age 25 onset, with gradual onset. Originally 1-2x every few months. Within the last 11/2018 he started having almost daily headaches - 28/30 migraine days a month. Duration of up to 72 hours. 1/2019 he started have vertigo/feeling warm/blind periphery (binocular)/shaky/weak in the setting of a headache lasting for 2 hours (moderate to severe HA pain) until pain improved with taking medication. 2/2019 he had similar as above with 1-2 muscle spasm or jerk in the arm/leg (not uncontrolled) and random wandering short-lived pains \"that make no sense\" like tongue or toes. Once he had these symptoms without a headache followed by mild headache.     Epilepsy: One recorded seizure at age 15 (was found on the ground by sister seizing) after recurrent fainting spells. EEG was abnormal in the past. Diagnosed with \"mild form of epilepsy\".  This episode he felt hot/faint/periphery blindness. No MRI performed.   Location: starting in the posterior occiput through top of head then behind one of his eyes  Quality: spike behind the eyes  Severity: moderate to severe   Modifying factors: worse with activity  Associated signs/symptoms: nausea, occasionally seeing things move randomly (rare), worsening anxiety due to work  Denies: bladder incontinence and bowel incontinence, light and noise sensitivity     Patient has " "tried:  -Excedrin/motirin - see below, within last 3 months taking; 15+ days a month   -Sumatriptan - 100mg PRN, 50% aborting headache with decrease efficacy now, taken with midol/excedrin with improve efficacy, taken appropriately, caused lightheadedness (mild) - was taking 10 pills within 14 days  -Topiramate - taken for few months, daily dose 100mg at night for at least multiple months with intermittent efficacy? He had irregular migraines - caused \"little to no emotion\" on the medication, less verbal and made depression worse, suicidal thoughts. Stopped 10/2018.   -Took an AED for a month  -Acetominophen causes mild flushing and itchiness      Past medical history:   Past Medical History:   Diagnosis Date   • H/O juvenile rheumatoid arthritis 4/2/2018   • Migraine with aura and without status migrainosus, not intractable 4/2/2018   • Muscle pain, myofascial 11/6/2018   • Nonintractable generalized idiopathic epilepsy without status epilepticus (HCC) 4/2/2018   • Recurrent major depressive disorder, in remission (HCC) 3/13/2019       Past surgical history:   History reviewed. No pertinent surgical history.    Family history:   Family History   Problem Relation Age of Onset   • Lung Disease Mother         COPD in a smoker   • Other Mother         epilepsy (late 30s), migraines    • Arthritis Mother    • No Known Problems Sister        Social history:   Social History   • Marital status: Single     Social History Main Topics   • Smoking status: Former Smoker     Years: 4.00     Types: Cigarettes     Quit date: 5/28/2017   • Smokeless tobacco: Never Used      Comment: Former Hookah   • Alcohol use No      Comment: Rarely   • Drug use: No   • Sexual activity: Not Currently     Partners: Male      Comment: single with [employer],  at [employer]     Current medications:   Current Outpatient Prescriptions   Medication   • Magnesium 500 MG Tab   • asa/apap/caffeine (EXCEDRIN) 250-250-65 MG Tab   • omeprazole " "(PRILOSEC) 20 MG delayed-release capsule   • Diclofenac Sodium 1 % Gel   • diclofenac EC (VOLTAREN) 75 MG Tablet Delayed Response   • sumatriptan (IMITREX) 100 MG tablet     No current facility-administered medications for this visit.        Medication Allergy:  Allergies   Allergen Reactions   • Acetaminophen    • Other Drug      medrisil       Review of Systems   Constitutional: Negative for fever and weight loss.   HENT: Negative for sore throat.    Eyes: Negative for discharge.   Respiratory: Negative for shortness of breath.    Cardiovascular: Negative for leg swelling.   Gastrointestinal: Negative for abdominal pain.   Genitourinary: Negative for dysuria.   Musculoskeletal: Negative for falls.   Skin: Negative for rash.   Neurological:        As per HPI   Endo/Heme/Allergies: Does not bruise/bleed easily.   Psychiatric/Behavioral: Negative for hallucinations.       Physical examination:   Vitals:    03/21/19 0758   BP: (!) 80/50   BP Location: Left arm   Patient Position: Sitting   BP Cuff Size: Adult   Pulse: 67   Resp: 16   Temp: 36.9 °C (98.4 °F)   TempSrc: Temporal   SpO2: 97%   Weight: 63 kg (139 lb)   Height: 1.854 m (6' 1\")   Repeat systolic blood pressure was in the 100s.    General: Patient is thin in no apparent distress.  Eyes: Ophthalmoscopic examination of the optic discs and posterior elements reveals sharp disk margins, normal vessels and pigmentation bilateral.  HENT: Normocephalic, atraumatic. Mallapatic score 2  Cardiovascular: No lower extremity edema.  Respiratory: Normal respiratory effort.   Skin: No appreciable signs of acute rashes or bruising.   Musculoskeletal: No signs of joint or muscle swelling.  Limited range of motion of the bilateral wrists.  He has deformity at the MCP joints bilaterally.  Psychiatric: Pleasant.     NEUROLOGICAL EXAM:   Mental status: Awake, alert and fully oriented to person, place, time and situation. Normal attention, concentration and fund of knowledge for " education level.   Speech and language: Speech is fluent without errors.  Cranial nerve exam:  II: Pupils are equally round and reactive to light. Visual fields are intact by confrontation.  III, IV, VI: EOMI, no diplopia, no ptosis.  V: Sensation to light touch is normal over V1-3 distributions bilaterally.  .  VII: Facial movements are symmetrical. There is no facial droop. .  VIII: Hearing intact to soft speech and finger rub bilaterally  IX: Palate elevates symmetrically, uvula is midline. Dysarthria is not present.  XI: Shoulder shrug are symmetrical and strong.   XII: Tongue protrudes midline.    Motor exam:  Muscle tone is normal in all 4 limbs.  No abnormal movements.    Muscle strength:     Right  Left  Deltoid   5/5  5/5      Biceps   5/5  5/5  Triceps  5/5  5/5   Wrist extensors fixed/5  fixed/5  Wrist flexors  fixed/5  fixed/5     limited/5 limited/5  Interossei  NT/5  NT/5  Thenar (APB)  NT/5  NT/5   Hip flexors  5/5  5/5  Quadriceps  5/5  5/5    Hamstrings  5/5  5/5  Dorsiflexors  5/5  5/5  Plantarflexors  5/5  5/5  Toe extension  NT/5  NT/5  NT = not tested    Sensory exam:  Intact to Light touch, Pinprick, Temperature and Vibration in bilateral upper and lower extremity.    Deep tendon reflexes:       Right  Left  Biceps   trace/4  trace/4  Triceps  trace/4  trace/4  Brachioradialis trace/4  trace/4  Knee jerk  2/4  2/4  Ankle jerk  0/4  0/4   bilateral toes are mute to plantar stimulation..    Coordination: shows a normal finger-nose-finger and heel to shin bilaterally.   Gait: Casual gait is normal.      ANCILLARY DATA REVIEWED:   Lab Data Review:  Lab Results   Component Value Date/Time    WBC 5.2 11/07/2018 04:00 PM    RBC 4.70 11/07/2018 04:00 PM    HEMOGLOBIN 14.9 11/07/2018 04:00 PM    HEMATOCRIT 45.3 11/07/2018 04:00 PM    MCV 96.4 11/07/2018 04:00 PM    MCH 31.7 11/07/2018 04:00 PM    MCHC 32.9 (L) 11/07/2018 04:00 PM    MPV 9.9 11/07/2018 04:00 PM    NEUTSPOLYS 62.80 11/07/2018 04:00 PM     LYMPHOCYTES 24.40 11/07/2018 04:00 PM    MONOCYTES 8.90 11/07/2018 04:00 PM    EOSINOPHILS 2.50 11/07/2018 04:00 PM    BASOPHILS 1.20 11/07/2018 04:00 PM      Lab Results   Component Value Date/Time    SODIUM 140 11/07/2018 04:00 PM    POTASSIUM 4.0 11/07/2018 04:00 PM    CHLORIDE 106 11/07/2018 04:00 PM    CO2 28 11/07/2018 04:00 PM    GLUCOSE 83 11/07/2018 04:00 PM    BUN 14 11/07/2018 04:00 PM    CREATININE 0.85 11/07/2018 04:00 PM       Lab Results  Component Value Date/Time   ASTSGOT 17 11/07/2018 1600   ALTSGPT 14 11/07/2018 1600   ALKPHOSPHAT 63 11/07/2018 1600   ALBUMIN 4.2 11/07/2018 1600       Imaging:   CT head without contrast: 3/2019 1.  No acute intracranial abnormality.  Records reviewed: Patient is sent by Dr. Pearce who are thoroughly documented his headaches.  Patient has a history of idiopathic epilepsy as well that has since resolved.  Patient is following for intermittent FMLA due to the frequency of his migraines.  There is a concern for possible complex migraine versus recurrence of epilepsy.  He does have a history of recurrent major depressive disorder currently controlled as of March 2019.    Patient was in the emergency room in March 2019 for accelerating migraine headaches.  Worsening migraines for over a years.  He was told to take magnesium, Motrin, caffeine for headaches progress.        ASSESSMENT AND PLAN:    1. Intractable chronic migraine without aura and without status migrainosus: Patient's current episodes are likely intractable migraine with a component of medication overuse headache due to the high frequency of his over-the-counter medication use as well as triptan use that has since decreased in efficacy.  He does not snore, no focal exam findings, no other red flag symptoms to suggest secondary etiology.  There may be a component of somatization due to his worsening anxiety at work as well contributing.  Is not uncommon to have dizziness trouble concentrating and other  symptoms associated with a severe migraine.  He does not have any hemiplegic or trigeminal cephalalgia type qualities to his headaches.  The suspicion for these prodromes are less likely seizures but given his history is warranting further workup to ensure there is no focal abnormality or change.  Due to the frequency of his headaches and goals to wean abortive medications, he is a good candidate for preventative therapy.  Topiramate caused suicidal ideation.  Patient's blood pressure may not be able to handle propranolol.  Depakote may be a mood stabilizer and AED.   - MR-BRAIN-WITH & W/O; Future  - REFERRAL TO NEURODIAGNOSTICS (EEG,EP,EMG/NCS/DBS) Modality Requested: EEG-Video  - CBC WITH DIFFERENTIAL; Future  - Comp Metabolic Panel; Future  - divalproex (DEPAKOTE) 500 MG Tablet Delayed Response; Take 1 Tab by mouth 3 times a day.  Dispense: 60 Tab; Refill: 5  -Headache diary    2. History of epilepsy: Self-reported history of epilepsy not requiring AEDs at age 15.  Reportedly abnormal EEG, and no MRI brain performed.  He reportedly saw a neurologist.  Seems a bit unusual to have seizures without MRI specially after seeing neurology.    3. Nonspecific paroxysmal spell: Suspect related to headache.  Workup as above    4. Depression/anxiety: Patient reports stable depression without HI or SI but worsened anxiety secondary to work.  We discussed behavioral modifications as well as medications and therapy.  Currently he will continue to meditate.  He will seek additional help if needed.    5. Hx of juvenile RA: In remission since teenage years, not on medications    FOLLOW-UP: Return in about 3 months (around 6/21/2019).  EDUCATION AND COUNSELING:  -I personally discussed the following with the patient:   Risks/benefits/side effects/alternatives of medication including but not limited to drowsiness, sedation, dizziness, increased risk for falls, hypersensitivity reactions including rash (which may be fatal), weight  changes, GI side effects (gastritis, ulcers, bleeding, changes in appetite, pancreatitis, change in bowel habits), liver dysfunction, increased risk for bleeding, increased risk for depression, anxiety, suicide, psychosis and mood changes and avoid abrupt cessation of medication., Diagnosis, prognosis, and treatment options discussed with patient at length.  , Discussed healthy lifestyle, including: healthy diet (rich in fruits, vegetables, nuts and healthy oils); proper hydration, and adequate sleep hygiene (allowing 7-8 hrs of overnight sleep)., Recommend regular exercise, proper hydration, healthy diet and stress reduction.  and Recommend improvement of sleep hygiene, including a structured schedule. Allow 7-8 hrs of overnight sleep. Avoid daytime naps. Discussed medication overuse headaches.    The patient understands and agrees that due to the complexity of his/her diagnosis, results of any testing and further recommendations will typically be discussed/made during a face to face encounter in my office. The patient and/or family further understands it is their responsibility to keep proper follow up.     Disclaimer  This dictation was created using voice recognition software. I have made every reasonable attempt to avoid dictation errors, but this document may contain an error not identified before finalizing. If the error changes the accuracy of the document, I would appreciate it being brought to my attention. Thank you very much.     Breonna Galarza MD  Neurology, Neurophysiology  [facility]      No Recipients

## 2019-03-27 ENCOUNTER — HOSPITAL ENCOUNTER (OUTPATIENT)
Dept: LAB | Facility: MEDICAL CENTER | Age: 34
End: 2019-03-27
Attending: PSYCHIATRY & NEUROLOGY
Payer: COMMERCIAL

## 2019-03-27 DIAGNOSIS — Z86.69 HISTORY OF EPILEPSY: ICD-10-CM

## 2019-03-27 DIAGNOSIS — G43.719 INTRACTABLE CHRONIC MIGRAINE WITHOUT AURA AND WITHOUT STATUS MIGRAINOSUS: ICD-10-CM

## 2019-03-27 DIAGNOSIS — R40.4 NONSPECIFIC PAROXYSMAL SPELL: ICD-10-CM

## 2019-03-27 LAB
ALBUMIN SERPL BCP-MCNC: 4.6 G/DL (ref 3.2–4.9)
ALBUMIN/GLOB SERPL: 1.3 G/DL
ALP SERPL-CCNC: 67 U/L (ref 30–99)
ALT SERPL-CCNC: 16 U/L (ref 2–50)
ANION GAP SERPL CALC-SCNC: 10 MMOL/L (ref 0–11.9)
AST SERPL-CCNC: 20 U/L (ref 12–45)
BASOPHILS # BLD AUTO: 1.4 % (ref 0–1.8)
BASOPHILS # BLD: 0.09 K/UL (ref 0–0.12)
BILIRUB SERPL-MCNC: 0.4 MG/DL (ref 0.1–1.5)
BUN SERPL-MCNC: 20 MG/DL (ref 8–22)
CALCIUM SERPL-MCNC: 9.5 MG/DL (ref 8.5–10.5)
CHLORIDE SERPL-SCNC: 105 MMOL/L (ref 96–112)
CO2 SERPL-SCNC: 27 MMOL/L (ref 20–33)
CREAT SERPL-MCNC: 0.85 MG/DL (ref 0.5–1.4)
EOSINOPHIL # BLD AUTO: 0.11 K/UL (ref 0–0.51)
EOSINOPHIL NFR BLD: 1.7 % (ref 0–6.9)
ERYTHROCYTE [DISTWIDTH] IN BLOOD BY AUTOMATED COUNT: 41.7 FL (ref 35.9–50)
FASTING STATUS PATIENT QL REPORTED: NORMAL
GLOBULIN SER CALC-MCNC: 3.6 G/DL (ref 1.9–3.5)
GLUCOSE SERPL-MCNC: 77 MG/DL (ref 65–99)
HCT VFR BLD AUTO: 45.8 % (ref 42–52)
HGB BLD-MCNC: 15.1 G/DL (ref 14–18)
IMM GRANULOCYTES # BLD AUTO: 0.01 K/UL (ref 0–0.11)
IMM GRANULOCYTES NFR BLD AUTO: 0.2 % (ref 0–0.9)
LYMPHOCYTES # BLD AUTO: 1.98 K/UL (ref 1–4.8)
LYMPHOCYTES NFR BLD: 30.6 % (ref 22–41)
MCH RBC QN AUTO: 31.7 PG (ref 27–33)
MCHC RBC AUTO-ENTMCNC: 33 G/DL (ref 33.7–35.3)
MCV RBC AUTO: 96.2 FL (ref 81.4–97.8)
MONOCYTES # BLD AUTO: 0.64 K/UL (ref 0–0.85)
MONOCYTES NFR BLD AUTO: 9.9 % (ref 0–13.4)
NEUTROPHILS # BLD AUTO: 3.65 K/UL (ref 1.82–7.42)
NEUTROPHILS NFR BLD: 56.2 % (ref 44–72)
NRBC # BLD AUTO: 0 K/UL
NRBC BLD-RTO: 0 /100 WBC
PLATELET # BLD AUTO: 295 K/UL (ref 164–446)
PMV BLD AUTO: 10.7 FL (ref 9–12.9)
POTASSIUM SERPL-SCNC: 4 MMOL/L (ref 3.6–5.5)
PROT SERPL-MCNC: 8.2 G/DL (ref 6–8.2)
RBC # BLD AUTO: 4.76 M/UL (ref 4.7–6.1)
SODIUM SERPL-SCNC: 142 MMOL/L (ref 135–145)
WBC # BLD AUTO: 6.5 K/UL (ref 4.8–10.8)

## 2019-03-27 PROCEDURE — 80053 COMPREHEN METABOLIC PANEL: CPT

## 2019-03-27 PROCEDURE — 85025 COMPLETE CBC W/AUTO DIFF WBC: CPT

## 2019-03-27 PROCEDURE — 36415 COLL VENOUS BLD VENIPUNCTURE: CPT

## 2019-04-01 RX ORDER — DIVALPROEX SODIUM 500 MG/1
500 TABLET, DELAYED RELEASE ORAL 2 TIMES DAILY
Qty: 60 TAB | Refills: 3 | Status: SHIPPED | OUTPATIENT
Start: 2019-04-01 | End: 2019-04-05

## 2019-04-01 NOTE — PROGRESS NOTES
Correcting instructions on Depakote.  Instructions were 3 times daily.  Patient was having side effects.  Very written for twice daily dosing. STANLEY

## 2019-04-05 ENCOUNTER — TELEPHONE (OUTPATIENT)
Dept: NEUROLOGY | Facility: MEDICAL CENTER | Age: 34
End: 2019-04-05

## 2019-04-05 RX ORDER — AMITRIPTYLINE HYDROCHLORIDE 10 MG/1
TABLET, FILM COATED ORAL
Qty: 90 TAB | Refills: 6 | Status: SHIPPED | OUTPATIENT
Start: 2019-04-05 | End: 2019-06-03

## 2019-04-05 NOTE — TELEPHONE ENCOUNTER
Call patient with regards to other medication treatment options for intractable chronic migraine.  Reviewed symptoms again, less likely seizures.  He does not tolerate topiramate and Depakote.  Julieth will not tolerate propranolol as well due to his already low blood pressure.  Discussed treatment options including but not limited to trial of Botox for chronic migraine versus amitriptyline.  He is more agreeable to amitriptyline with slow titration.  We discussed the risks/benefits versus side effects/alternatives to medications including but not limited to potential for suicidal ideation and weight gain and cardiac conduction problem.  He communicated understanding.  Will write prescription for amitriptyline with follow-up in June.  Consider Botox at that time if ineffective or not at goal with goal doses.    Breonna Galarza MD  Neurology - Neurophysiology  [facility]  Office: [phone]

## 2019-04-16 ENCOUNTER — HOSPITAL ENCOUNTER (OUTPATIENT)
Dept: RADIOLOGY | Facility: MEDICAL CENTER | Age: 34
End: 2019-04-16
Attending: PSYCHIATRY & NEUROLOGY
Payer: COMMERCIAL

## 2019-04-16 DIAGNOSIS — G43.719 INTRACTABLE CHRONIC MIGRAINE WITHOUT AURA AND WITHOUT STATUS MIGRAINOSUS: ICD-10-CM

## 2019-04-16 DIAGNOSIS — Z86.69 HISTORY OF EPILEPSY: ICD-10-CM

## 2019-04-16 DIAGNOSIS — R40.4 NONSPECIFIC PAROXYSMAL SPELL: ICD-10-CM

## 2019-04-16 PROCEDURE — 70553 MRI BRAIN STEM W/O & W/DYE: CPT

## 2019-04-16 PROCEDURE — A9585 GADOBUTROL INJECTION: HCPCS | Performed by: PSYCHIATRY & NEUROLOGY

## 2019-04-16 PROCEDURE — 700117 HCHG RX CONTRAST REV CODE 255: Performed by: PSYCHIATRY & NEUROLOGY

## 2019-04-16 RX ORDER — GADOBUTROL 604.72 MG/ML
6 INJECTION INTRAVENOUS ONCE
Status: COMPLETED | OUTPATIENT
Start: 2019-04-16 | End: 2019-04-16

## 2019-04-16 RX ADMIN — GADOBUTROL 6 ML: 604.72 INJECTION INTRAVENOUS at 09:45

## 2019-04-28 ENCOUNTER — PATIENT MESSAGE (OUTPATIENT)
Dept: MEDICAL GROUP | Facility: MEDICAL CENTER | Age: 34
End: 2019-04-28

## 2019-04-29 NOTE — TELEPHONE ENCOUNTER
From: Sidney Culver  To: Adelina Pearce M.D.  Sent: 4/28/2019 11:57 PM PDT  Subject: Non-Urgent Medical Question    My condition appears to be worsening, and my EEG is scheduled for August 9th. In the meantime, missing work is causing me financial distress. Do you know of any possibility for moving the appointment closer (I am already on the wait list)? Also, would you happen to know of any hoops I would have to jump through to get onto a short term leave so that at least my finances remain stable?

## 2019-05-08 ENCOUNTER — OFFICE VISIT (OUTPATIENT)
Dept: NEUROLOGY | Facility: MEDICAL CENTER | Age: 34
End: 2019-05-08
Payer: COMMERCIAL

## 2019-05-08 VITALS
RESPIRATION RATE: 16 BRPM | WEIGHT: 139 LBS | BODY MASS INDEX: 18.42 KG/M2 | SYSTOLIC BLOOD PRESSURE: 112 MMHG | HEART RATE: 98 BPM | TEMPERATURE: 98.1 F | DIASTOLIC BLOOD PRESSURE: 80 MMHG | OXYGEN SATURATION: 98 % | HEIGHT: 73 IN

## 2019-05-08 DIAGNOSIS — G43.109 MIGRAINE WITH AURA AND WITHOUT STATUS MIGRAINOSUS, NOT INTRACTABLE: ICD-10-CM

## 2019-05-08 DIAGNOSIS — T88.7XXA MEDICATION SIDE EFFECT: ICD-10-CM

## 2019-05-08 DIAGNOSIS — F33.40 RECURRENT MAJOR DEPRESSIVE DISORDER, IN REMISSION (HCC): ICD-10-CM

## 2019-05-08 DIAGNOSIS — R25.9 ABNORMAL MOVEMENTS: ICD-10-CM

## 2019-05-08 PROCEDURE — 99214 OFFICE O/P EST MOD 30 MIN: CPT | Performed by: PSYCHIATRY & NEUROLOGY

## 2019-05-08 ASSESSMENT — ENCOUNTER SYMPTOMS
HALLUCINATIONS: 0
SHORTNESS OF BREATH: 0
SORE THROAT: 0
ABDOMINAL PAIN: 0
FALLS: 0
BRUISES/BLEEDS EASILY: 0
FEVER: 0
EYE DISCHARGE: 0
WEIGHT LOSS: 0

## 2019-05-08 NOTE — LETTER
"     [facility]   [address], [address]  [place] [address]  Phone: [phone]  Fax: [phone]              Sidney Culver  1985    Encounter Date: 5/8/2019    Breonna Galarza M.D.          PROGRESS NOTE:  Chief Complaint   Patient presents with   • Follow-Up     Chronic migraine     History of present illness:  Sidney Culver 34 y.o. male presents today for migraine/abnormal movement follow up and FMLA paperwork.   Patient was orginally referred by Adelina Pearce M.D.  History is obtained from patient.  and Patient is accompanied by self.    Duration/timing: see below  Context:   Migraines: Age 25 onset, with gradual onset. Originally 1-2x every few months. Within the last 11/2018 he started having almost daily headaches - 28/30 migraine days a month. Duration of up to 72 hours. 1/2019 he started have vertigo/feeling warm/blind periphery (binocular)/shaky/weak in the setting of a headache lasting for 2 hours (moderate to severe HA pain) until pain improved with taking medication. 2/2019 he had similar as above with 1-2 muscle spasm or jerk in the arm/leg (not uncontrolled) and random wandering short-lived pains \"that make no sense\" like tongue or toes. Once he had these symptoms without a headache followed by mild headache. He works at a [employer] and does heavy lifting.     Epilepsy: One recorded seizure at age 15 (was found on the ground by sister seizing) after recurrent fainting spells. EEG was abnormal in the past. Diagnosed with \"mild form of epilepsy\".  This episode he felt hot/faint/periphery blindness. No MRI performed.   Location: starting in the posterior occiput through top of head then behind one of his eyes  Quality: spike behind the eyes  Severity: moderate to severe   Modifying factors: worse with activity  Associated signs/symptoms: nausea, occasionally seeing things move randomly (rare), worsening anxiety due to work  Denies: bladder " "incontinence and bowel incontinence, light and noise sensitivity     Patient has tried:  -Excedrin/motirin - see below, within last 3 months taking; 15+ days a month   -Sumatriptan - 100mg PRN, 50% aborting headache with decrease efficacy now, taken with midol/excedrin with improve efficacy, taken appropriately, caused lightheadedness (mild) - was taking 10 pills within 14 days  -Topiramate - taken for few months, daily dose 100mg at night for at least multiple months with intermittent efficacy? He had irregular migraines - caused \"little to no emotion\" on the medication, less verbal and made depression worse, suicidal thoughts. Stopped 10/2018.   -Took an AED for a month of unknown type  -Acetominophen causes mild flushing and itchiness  -Depakote prescribed by me, April 2019, caused intermittent nausea migraine  -Amitriptyline -abnormal movements?,  Prescribed by me April 2019    Subjective: Patient was last seen in neurology clinic on March 21, 2019 by me. Since last visit was not able to tolerate Depakote due to side effects as above.  He was switched to amitriptyline.  He cannot go to work with a 6-8 pain. With assoication to these extra symptoms with aura/spasms. EEG appointment is moved to 5/14/19.    Since I been seeing him states he has had worsening shakiness and tremors that are stronger the longer he is up.  They are present with without migraine.  There is increase in his muscle spasms are separate from his migraines as well.  He is concerned this is related to his amitriptyline. He is at 30mg for 2.5 weeks. The spasms are in his core. These movements are constant, since 20mg dose. He doesn't have video today. He has intermittent leave. He is looking for short term leave and will give him a portion of his pay. This is short term disability for 3 months.       Past medical history:   Past Medical History:   Diagnosis Date   • H/O juvenile rheumatoid arthritis 4/2/2018   • Migraine with aura and without " status migrainosus, not intractable 4/2/2018   • Muscle pain, myofascial 11/6/2018   • Nonintractable generalized idiopathic epilepsy without status epilepticus (HCC) 4/2/2018   • Recurrent major depressive disorder, in remission (HCC) 3/13/2019       Past surgical history:   No past surgical history on file.    Family history:   Family History   Problem Relation Age of Onset   • Lung Disease Mother         COPD in a smoker   • Other Mother         epilepsy (late 30s), migraines    • Arthritis Mother    • No Known Problems Sister        Social history:   Social History   • Marital status: Single     Social History Main Topics   • Smoking status: Former Smoker     Years: 4.00     Types: Cigarettes     Quit date: 5/28/2017   • Smokeless tobacco: Never Used      Comment: Former Hookah   • Alcohol use No      Comment: Rarely   • Drug use: No   • Sexual activity: Not Currently     Partners: Male      Comment: single with SO,  at the [employer]     Current medications:   Current Outpatient Prescriptions   Medication   • amitriptyline (ELAVIL) 10 MG Tab   • Magnesium 500 MG Tab   • asa/apap/caffeine (EXCEDRIN) 250-250-65 MG Tab   • omeprazole (PRILOSEC) 20 MG delayed-release capsule   • Diclofenac Sodium 1 % Gel   • diclofenac EC (VOLTAREN) 75 MG Tablet Delayed Response     No current facility-administered medications for this visit.        Medication Allergy:  Allergies   Allergen Reactions   • Acetaminophen    • Other Drug      medrisil       Review of Systems   Constitutional: Negative for fever and weight loss.   HENT: Negative for sore throat.    Eyes: Negative for discharge.   Respiratory: Negative for shortness of breath.    Cardiovascular: Negative for leg swelling.   Gastrointestinal: Negative for abdominal pain.   Genitourinary: Negative for dysuria.   Musculoskeletal: Negative for falls.   Skin: Negative for rash.   Neurological:        As per HPI   Endo/Heme/Allergies: Does not bruise/bleed easily.  "  Psychiatric/Behavioral: Negative for hallucinations.       Physical examination:   Vitals:    05/08/19 0759   BP: 112/80   BP Location: Left arm   Patient Position: Sitting   BP Cuff Size: Adult   Pulse: 98   Resp: 16   Temp: 36.7 °C (98.1 °F)   TempSrc: Temporal   SpO2: 98%   Weight: 63 kg (139 lb)   Height: 1.854 m (6' 1\")     General: Patient is thin in no apparent distress.  HENT: Normocephalic, atraumatic.   Cardiovascular: No lower extremity edema.  Respiratory: Normal respiratory effort.   Skin: No appreciable signs of acute rashes or bruising.   Musculoskeletal: No signs of joint or muscle swelling.  Limited range of motion of the bilateral wrists.  He has deformity at the MCP joints bilaterally.  Psychiatric: Pleasant.     NEUROLOGICAL EXAM:   Mental status: Awake, alert and fully oriented to person, place, time and situation. Normal attention, concentration and fund of knowledge for education level.   Speech and language: Speech is fluent without errors than occasional stuttering  Cranial nerve exam:  III, IV, VI: Conjugate gaze no ptosis.  VII: Facial movements are symmetrical. There is no facial droop. .  VIII: Hearing intact to soft speech   IX: Dysarthria is not present.  XI: Shoulder shrug are symmetrical and strong with giveaway weakness within seconds.  XII: Tongue protrudes midline.    Motor exam:  Muscle tone is normal in all 4 limbs.  Patient has intermittent twitching of his torso to the left every 2 seconds that changes in severity and amplitude during my examination.  When I attempt to check his strength he has trembling in all 4 extremities proximal and distally.  When I have him ambulate he still has the twitching in his torso.  When I have him tandem and heel and toe walk the twitching is resolved.  There are no fasciculations or prolonged muscle spasms or changes in posture suggestive of a dystonia.    Muscle strength: He is able to give brief 5 out of 5 strength in the proximal upper " and lower extremity though there is giveaway.    Sensory exam: prior exam  Intact to Light touch, Pinprick, Temperature and Vibration in bilateral upper and lower extremity.    Deep tendon reflexes:       Right  Left  Biceps   trace/4  trace/4  Triceps  trace/4  trace/4  Brachioradialis trace/4  trace/4  Knee jerk  2/4  2/4  Ankle jerk  0/4  0/4     Coordination: shows a normal finger-nose-finger   Gait: Casual gait is normal., Heel walk is normal., Toe walk is normal. and Tandem gait is normal.      ANCILLARY DATA REVIEWED:   Lab Data Review: CBC CMP reviewed March 2019, unremarkable  Imaging:   MRI brain with and without contrast April 2019:   1.  No acute intracranial abnormality.  2.  No mass or abnormal enhancement within the brain.  3.  No evidence for mesial temporal sclerosis.    ASSESSMENT AND PLAN:    1. Intractable chronic migraine without aura and without status migrainosus: Patient's current episodes are likely intractable migraine with a component of medication overuse headache due to the high frequency of his over-the-counter medication use as well as triptan use that has since decreased in efficacy.  He does not snore, no focal exam findings, no other red flag symptoms to suggest secondary etiology.  There may be a component of somatization due to his worsening anxiety at work as well contributing.  Is not uncommon to have dizziness trouble concentrating and other symptoms associated with a severe migraine.  He does not have any hemiplegic or trigeminal cephalalgia type qualities to his headaches.  The suspicion for these prodromes are less likely seizures but given his history is warranting further workup to ensure there is no focal abnormality or change.  Due to the frequency of his headaches and goals to wean abortive medications, he is a good candidate for preventative therapy.  Topiramate caused suicidal ideation.  Patient's blood pressure may not be able to handle propranolol.  Depakote may be  "a mood stabilizer and AED.   -Patient to wean off amitriptyline by 10 mg weekly until off  -He is requesting disability for his twitching which may be medication related and affecting his ability to lift, I will fill out paperwork until he is off the meds entirely.  - REFERRAL TO NEURODIAGNOSTICS (EEG,EP,EMG/NCS/DBS) Modality Requested: EEG-Video  -We discussed other headache treatment options including but not limited to CGRP antagonists and Botox, he may be open to CGRP.  We will revisit this after he is weaned off of amitriptyline to avoid any confusion with side effects.  -We discussed establish care with psychiatrist given his \"mild anxiety\" and recurrent major depressive disorder as stress and anxiety can contribute to migraines    2. History of epilepsy: Self-reported history of epilepsy not requiring AEDs at age 15.  Reportedly abnormal EEG, and MRI brain with and without contrast 2019 was normal.  He reportedly saw a neurologist.     3.  Abnormal movements: Differential includes medication related versus functional given the waxing and waning nature and inconsistencies of his symptoms.  They are related to increasing his dose of amitriptyline.  He reports he has a mild history of anxiety.  -EEG as above    4. Depression/anxiety: Patient reports stable depression without HI or SI but worsened anxiety secondary to work.  We discussed behavioral modifications as well as medications and therapy.  Currently he will continue to meditate.  He will seek additional help if needed.    5. Hx of juvenile RA: In remission since teenage years, not on medications    FOLLOW-UP: Return in about 4 weeks (around 6/5/2019) for 20 min ok.  To reevaluate symptoms off medication.  Rediscussed other treatment options. Review EEG.   EDUCATION AND COUNSELING:  -I personally discussed the following with the patient:   Risks/benefits/side effects/alternatives of medication including but not limited to GI side effects (gastritis, " ulcers, bleeding, changes in appetite, pancreatitis, change in bowel habits) and Local site reactions, fatigue., Diagnosis, prognosis, and treatment options discussed with patient at length.  , Discussed healthy lifestyle, including: healthy diet (rich in fruits, vegetables, nuts and healthy oils); proper hydration, and adequate sleep hygiene (allowing 7-8 hrs of overnight sleep)., Recommend regular exercise, proper hydration, healthy diet and stress reduction. , Recommend improvement of sleep hygiene, including a structured schedule. Allow 7-8 hrs of overnight sleep. Avoid daytime naps. , Risks/benefits/side effects/alternatives to treatment of chronic intractable migraine including but not limited to side effects to Botox (bruising and injection site reactions, facial asymmetry, trouble swallowing, head drop, systemic side effects such as shortness of breath which can be life-threatening) and Discussed treatment expectations.Discussed medication overuse headaches.    The patient understands and agrees that due to the complexity of his/her diagnosis, results of any testing and further recommendations will typically be discussed/made during a face to face encounter in my office. The patient and/or family further understands it is their responsibility to keep proper follow up.     Disclaimer  This dictation was created using voice recognition software. I have made every reasonable attempt to avoid dictation errors, but this document may contain an error not identified before finalizing. If the error changes the accuracy of the document, I would appreciate it being brought to my attention. Thank you very much.     I spent 25 minutes face-to-face in which greater than 50% of the visit was spent in counseling/coordination of care as detailed above.       Breonna Galarza MD  Neurology, Neurophysiology  [facility]      No Recipients

## 2019-05-08 NOTE — PROGRESS NOTES
"Chief Complaint   Patient presents with   • Follow-Up     Chronic migraine     History of present illness:  Sidney Culver 34 y.o. male presents today for migraine/abnormal movement follow up and FMLA paperwork.   Patient was orginally referred by Adelina Pearce M.D.  History is obtained from patient.  and Patient is accompanied by self.    Duration/timing: see below  Context:   Migraines: Age 25 onset, with gradual onset. Originally 1-2x every few months. Within the last 11/2018 he started having almost daily headaches - 28/30 migraine days a month. Duration of up to 72 hours. 1/2019 he started have vertigo/feeling warm/blind periphery (binocular)/shaky/weak in the setting of a headache lasting for 2 hours (moderate to severe HA pain) until pain improved with taking medication. 2/2019 he had similar as above with 1-2 muscle spasm or jerk in the arm/leg (not uncontrolled) and random wandering short-lived pains \"that make no sense\" like tongue or toes. Once he had these symptoms without a headache followed by mild headache. He works at a [email or web address] and does heavy lifting.     Epilepsy: One recorded seizure at age 15 (was found on the ground by sister seizing) after recurrent fainting spells. EEG was abnormal in the past. Diagnosed with \"mild form of epilepsy\".  This episode he felt hot/faint/periphery blindness. No MRI performed.   Location: starting in the posterior occiput through top of head then behind one of his eyes  Quality: spike behind the eyes  Severity: moderate to severe   Modifying factors: worse with activity  Associated signs/symptoms: nausea, occasionally seeing things move randomly (rare), worsening anxiety due to work  Denies: bladder incontinence and bowel incontinence, light and noise sensitivity     Patient has tried:  -Excedrin/motirin - see below, within last 3 months taking; 15+ days a month   -Sumatriptan - 100mg PRN, 50% aborting headache with decrease efficacy now, taken with " "midol/excedrin with improve efficacy, taken appropriately, caused lightheadedness (mild) - was taking 10 pills within 14 days  -Topiramate - taken for few months, daily dose 100mg at night for at least multiple months with intermittent efficacy? He had irregular migraines - caused \"little to no emotion\" on the medication, less verbal and made depression worse, suicidal thoughts. Stopped 10/2018.   -Took an AED for a month of unknown type  -Acetominophen causes mild flushing and itchiness  -Depakote prescribed by me, April 2019, caused intermittent nausea migraine  -Amitriptyline -abnormal movements?,  Prescribed by me April 2019    Subjective: Patient was last seen in neurology clinic on March 21, 2019 by me. Since last visit was not able to tolerate Depakote due to side effects as above.  He was switched to amitriptyline.  He cannot go to work with a 6-8 pain. With assoication to these extra symptoms with aura/spasms. EEG appointment is moved to 5/14/19.    Since I been seeing him states he has had worsening shakiness and tremors that are stronger the longer he is up.  They are present with without migraine.  There is increase in his muscle spasms are separate from his migraines as well.  He is concerned this is related to his amitriptyline. He is at 30mg for 2.5 weeks. The spasms are in his core. These movements are constant, since 20mg dose. He doesn't have video today. He has intermittent leave. He is looking for short term leave and will give him a portion of his pay. This is short term disability for 3 months.       Past medical history:   Past Medical History:   Diagnosis Date   • H/O juvenile rheumatoid arthritis 4/2/2018   • Migraine with aura and without status migrainosus, not intractable 4/2/2018   • Muscle pain, myofascial 11/6/2018   • Nonintractable generalized idiopathic epilepsy without status epilepticus (HCC) 4/2/2018   • Recurrent major depressive disorder, in remission (HCC) 3/13/2019       Past " surgical history:   No past surgical history on file.    Family history:   Family History   Problem Relation Age of Onset   • Lung Disease Mother         COPD in a smoker   • Other Mother         epilepsy (late 30s), migraines    • Arthritis Mother    • No Known Problems Sister        Social history:   Social History   • Marital status: Single     Social History Main Topics   • Smoking status: Former Smoker     Years: 4.00     Types: Cigarettes     Quit date: 5/28/2017   • Smokeless tobacco: Never Used      Comment: Former Hookah   • Alcohol use No      Comment: Rarely   • Drug use: No   • Sexual activity: Not Currently     Partners: Male      Comment: single with SO,  at the [employer]     Current medications:   Current Outpatient Prescriptions   Medication   • amitriptyline (ELAVIL) 10 MG Tab   • Magnesium 500 MG Tab   • asa/apap/caffeine (EXCEDRIN) 250-250-65 MG Tab   • omeprazole (PRILOSEC) 20 MG delayed-release capsule   • Diclofenac Sodium 1 % Gel   • diclofenac EC (VOLTAREN) 75 MG Tablet Delayed Response     No current facility-administered medications for this visit.        Medication Allergy:  Allergies   Allergen Reactions   • Acetaminophen    • Other Drug      medrisil       Review of Systems   Constitutional: Negative for fever and weight loss.   HENT: Negative for sore throat.    Eyes: Negative for discharge.   Respiratory: Negative for shortness of breath.    Cardiovascular: Negative for leg swelling.   Gastrointestinal: Negative for abdominal pain.   Genitourinary: Negative for dysuria.   Musculoskeletal: Negative for falls.   Skin: Negative for rash.   Neurological:        As per HPI   Endo/Heme/Allergies: Does not bruise/bleed easily.   Psychiatric/Behavioral: Negative for hallucinations.       Physical examination:   Vitals:    05/08/19 0759   BP: 112/80   BP Location: Left arm   Patient Position: Sitting   BP Cuff Size: Adult   Pulse: 98   Resp: 16   Temp: 36.7 °C (98.1 °F)   TempSrc:  "Temporal   SpO2: 98%   Weight: 63 kg (139 lb)   Height: 1.854 m (6' 1\")     General: Patient is thin in no apparent distress.  HENT: Normocephalic, atraumatic.   Cardiovascular: No lower extremity edema.  Respiratory: Normal respiratory effort.   Skin: No appreciable signs of acute rashes or bruising.   Musculoskeletal: No signs of joint or muscle swelling.  Limited range of motion of the bilateral wrists.  He has deformity at the MCP joints bilaterally.  Psychiatric: Pleasant.     NEUROLOGICAL EXAM:   Mental status: Awake, alert and fully oriented to person, place, time and situation. Normal attention, concentration and fund of knowledge for education level.   Speech and language: Speech is fluent without errors than occasional stuttering  Cranial nerve exam:  III, IV, VI: Conjugate gaze no ptosis.  VII: Facial movements are symmetrical. There is no facial droop. .  VIII: Hearing intact to soft speech   IX: Dysarthria is not present.  XI: Shoulder shrug are symmetrical and strong with giveaway weakness within seconds.  XII: Tongue protrudes midline.    Motor exam:  Muscle tone is normal in all 4 limbs.  Patient has intermittent twitching of his torso to the left every 2 seconds that changes in severity and amplitude during my examination.  When I attempt to check his strength he has trembling in all 4 extremities proximal and distally.  When I have him ambulate he still has the twitching in his torso.  When I have him tandem and heel and toe walk the twitching is resolved.  There are no fasciculations or prolonged muscle spasms or changes in posture suggestive of a dystonia.    Muscle strength: He is able to give brief 5 out of 5 strength in the proximal upper and lower extremity though there is giveaway.    Sensory exam: prior exam  Intact to Light touch, Pinprick, Temperature and Vibration in bilateral upper and lower extremity.    Deep tendon reflexes:  "      Right  Left  Biceps   trace/4  trace/4  Triceps  trace/4  trace/4  Brachioradialis trace/4  trace/4  Knee jerk  2/4  2/4  Ankle jerk  0/4  0/4     Coordination: shows a normal finger-nose-finger   Gait: Casual gait is normal., Heel walk is normal., Toe walk is normal. and Tandem gait is normal.      ANCILLARY DATA REVIEWED:   Lab Data Review: CBC CMP reviewed March 2019, unremarkable  Imaging:   MRI brain with and without contrast April 2019:   1.  No acute intracranial abnormality.  2.  No mass or abnormal enhancement within the brain.  3.  No evidence for mesial temporal sclerosis.    ASSESSMENT AND PLAN:    1. Intractable chronic migraine without aura and without status migrainosus: Patient's current episodes are likely intractable migraine with a component of medication overuse headache due to the high frequency of his over-the-counter medication use as well as triptan use that has since decreased in efficacy.  He does not snore, no focal exam findings, no other red flag symptoms to suggest secondary etiology.  There may be a component of somatization due to his worsening anxiety at work as well contributing.  Is not uncommon to have dizziness trouble concentrating and other symptoms associated with a severe migraine.  He does not have any hemiplegic or trigeminal cephalalgia type qualities to his headaches.  The suspicion for these prodromes are less likely seizures but given his history is warranting further workup to ensure there is no focal abnormality or change.  Due to the frequency of his headaches and goals to wean abortive medications, he is a good candidate for preventative therapy.  Topiramate caused suicidal ideation.  Patient's blood pressure may not be able to handle propranolol.  Depakote may be a mood stabilizer and AED.   -Patient to wean off amitriptyline by 10 mg weekly until off  -He is requesting disability for his twitching which may be medication related and affecting his ability to  "lift, I will fill out paperwork until he is off the meds entirely.  - REFERRAL TO NEURODIAGNOSTICS (EEG,EP,EMG/NCS/DBS) Modality Requested: EEG-Video  -We discussed other headache treatment options including but not limited to CGRP antagonists and Botox, he may be open to CGRP.  We will revisit this after he is weaned off of amitriptyline to avoid any confusion with side effects.  -We discussed establish care with psychiatrist given his \"mild anxiety\" and recurrent major depressive disorder as stress and anxiety can contribute to migraines    2. History of epilepsy: Self-reported history of epilepsy not requiring AEDs at age 15.  Reportedly abnormal EEG, and MRI brain with and without contrast 2019 was normal.  He reportedly saw a neurologist.     3.  Abnormal movements: Differential includes medication related versus functional given the waxing and waning nature and inconsistencies of his symptoms.  They are related to increasing his dose of amitriptyline.  He reports he has a mild history of anxiety.  -EEG as above    4. Depression/anxiety: Patient reports stable depression without HI or SI but worsened anxiety secondary to work.  We discussed behavioral modifications as well as medications and therapy.  Currently he will continue to meditate.  He will seek additional help if needed.    5. Hx of juvenile RA: In remission since teenage years, not on medications    FOLLOW-UP: Return in about 4 weeks (around 6/5/2019) for 20 min ok.  To reevaluate symptoms off medication.  Rediscussed other treatment options. Review EEG.   EDUCATION AND COUNSELING:  -I personally discussed the following with the patient:   Risks/benefits/side effects/alternatives of medication including but not limited to GI side effects (gastritis, ulcers, bleeding, changes in appetite, pancreatitis, change in bowel habits) and Local site reactions, fatigue., Diagnosis, prognosis, and treatment options discussed with patient at length.  , Discussed " healthy lifestyle, including: healthy diet (rich in fruits, vegetables, nuts and healthy oils); proper hydration, and adequate sleep hygiene (allowing 7-8 hrs of overnight sleep)., Recommend regular exercise, proper hydration, healthy diet and stress reduction. , Recommend improvement of sleep hygiene, including a structured schedule. Allow 7-8 hrs of overnight sleep. Avoid daytime naps. , Risks/benefits/side effects/alternatives to treatment of chronic intractable migraine including but not limited to side effects to Botox (bruising and injection site reactions, facial asymmetry, trouble swallowing, head drop, systemic side effects such as shortness of breath which can be life-threatening) and Discussed treatment expectations.Discussed medication overuse headaches.    The patient understands and agrees that due to the complexity of his/her diagnosis, results of any testing and further recommendations will typically be discussed/made during a face to face encounter in my office. The patient and/or family further understands it is their responsibility to keep proper follow up.     Disclaimer  This dictation was created using voice recognition software. I have made every reasonable attempt to avoid dictation errors, but this document may contain an error not identified before finalizing. If the error changes the accuracy of the document, I would appreciate it being brought to my attention. Thank you very much.     I spent 25 minutes face-to-face in which greater than 50% of the visit was spent in counseling/coordination of care as detailed above.       Breonna Galarza MD  Neurology, Neurophysiology  [facility]

## 2019-05-10 ENCOUNTER — TELEPHONE (OUTPATIENT)
Dept: NEUROLOGY | Facility: MEDICAL CENTER | Age: 34
End: 2019-05-10

## 2019-05-10 NOTE — TELEPHONE ENCOUNTER
Went over paperwork with patient over the phone because he would like for us to fax. Pt agree's with short term leave.    Paperwork faxed and scanned into chart.

## 2019-05-14 ENCOUNTER — NON-PROVIDER VISIT (OUTPATIENT)
Dept: NEUROLOGY | Facility: MEDICAL CENTER | Age: 34
End: 2019-05-14
Payer: COMMERCIAL

## 2019-05-14 ENCOUNTER — TELEPHONE (OUTPATIENT)
Dept: NEUROLOGY | Facility: MEDICAL CENTER | Age: 34
End: 2019-05-14

## 2019-05-14 DIAGNOSIS — Z86.69 HISTORY OF EPILEPSY: ICD-10-CM

## 2019-05-14 PROCEDURE — 95951 EEG: CPT | Mod: 52 | Performed by: PSYCHIATRY & NEUROLOGY

## 2019-05-14 NOTE — PROCEDURES
ROUTINE VIDEO ELECTROENCEPHALOGRAM REPORT      REFERRING PROVIDER: Breonna Galarza  DOS: 5/14/19 (total recording of 38 minutes)    INDICATION:  Sidney Culver 34 y.o. male presenting with spasms of uncertain etiology. He reports a history of epilepsy as a child.   CURRENT ANTIEPILEPTIC REGIMEN: None    TECHNIQUE: 30 channel routine video electroencephalogram (EEG) was performed in accordance with the international 10-20 system. The study was reviewed in bipolar and referential montages. The recording examined the patient during wakeful and drowsy/sleep state(s).     DESCRIPTION OF RECORD:   When maximally alert, the background activity of this recording was characterized by a well preserved frontal beta to occipital alpha gradient with a posterior dominant 10 Hz rhythm that attenuated appropriate to eye opening.    The patient slept spontaneously during this recording, and adequate levels of stage II sleep, characterized by vertex waves and symmetric sleep spindles, were observed.       ACTIVATION PROCEDURES:   Hyperventilation induced a significant amount of high amplitude slow activity that rapidly returned to baseline upon cessation of overbreathing.    Photic stimulation induced a symmetric occipital driving response.      ICTAL AND/OR INTERICTAL FINDINGS:   During the study, patient had multiple right arm spasms and one body spasm without abnormal EEG correlate. No focal or generalized epileptiform activity noted. No regional slowing was seen during this routine study.  No seizures were reported or recorded during the study.     EKG: sampling of the EKG recording demonstrated sinus rhythm.       INTERPRETATION:  This is a normal awake and asleep video EEG.       CLINICAL CORRELATION:   A normal EEG does not exclude the possibility of underlying seizures or a diagnosis of epilepsy.    Breonna Galarza MD  Neurology - Neurophysiology  [facility]

## 2019-05-14 NOTE — TELEPHONE ENCOUNTER
Received voicemail from Micaela SHORT at [employer] needing clarification on pt's disability paperwork.  I have left a message for her to call back to discuss.     Ph: [phone]

## 2019-06-03 ENCOUNTER — OFFICE VISIT (OUTPATIENT)
Dept: NEUROLOGY | Facility: MEDICAL CENTER | Age: 34
End: 2019-06-03
Payer: COMMERCIAL

## 2019-06-03 VITALS
OXYGEN SATURATION: 95 % | SYSTOLIC BLOOD PRESSURE: 94 MMHG | DIASTOLIC BLOOD PRESSURE: 50 MMHG | TEMPERATURE: 97.8 F | BODY MASS INDEX: 18.16 KG/M2 | RESPIRATION RATE: 16 BRPM | HEART RATE: 70 BPM | WEIGHT: 137 LBS | HEIGHT: 73 IN

## 2019-06-03 DIAGNOSIS — G43.109 MIGRAINE WITH AURA AND WITHOUT STATUS MIGRAINOSUS, NOT INTRACTABLE: ICD-10-CM

## 2019-06-03 DIAGNOSIS — Z87.39 H/O JUVENILE RHEUMATOID ARTHRITIS: ICD-10-CM

## 2019-06-03 PROCEDURE — 99214 OFFICE O/P EST MOD 30 MIN: CPT | Performed by: PSYCHIATRY & NEUROLOGY

## 2019-06-03 ASSESSMENT — ENCOUNTER SYMPTOMS
WEIGHT LOSS: 0
EYE DISCHARGE: 0
FALLS: 0
HALLUCINATIONS: 0
FEVER: 0
ABDOMINAL PAIN: 0
SORE THROAT: 0
BRUISES/BLEEDS EASILY: 0
SHORTNESS OF BREATH: 0

## 2019-06-03 NOTE — PROGRESS NOTES
"Chief Complaint   Patient presents with   • Follow-Up     Migraine     History of present illness:  Sidney Culver 34 y.o. male presents today for migraine/abnormal movement follow up.  History is obtained from patient.  and Patient is accompanied by self.     Duration/timing: see below  Context:   Migraines: Age 25 onset, with gradual onset. Originally 1-2x every few months. Within the last 11/2018 he started having almost daily headaches - 28/30 migraine days a month. Duration of up to 72 hours. 1/2019 he started have vertigo/feeling warm/blind periphery (binocular)/shaky/weak in the setting of a headache lasting for 2 hours (moderate to severe HA pain) until pain improved with taking medication. 2/2019 he had similar as above with 1-2 muscle spasm or jerk in the arm/leg (not uncontrolled) and random wandering short-lived pains \"that make no sense\" like tongue or toes. Once he had these symptoms without a headache followed by mild headache. He works at a [employer] and does heavy lifting.     Epilepsy: One recorded seizure at age 15 (was found on the ground by sister seizing) after recurrent fainting spells. EEG was abnormal in the past. Diagnosed with \"mild form of epilepsy\".  This episode he felt hot/faint/periphery blindness. No MRI performed.   Location: starting in the posterior occiput through top of head then behind one of his eyes  Quality: spike behind the eyes  Severity: moderate to severe   Modifying factors: worse with activity  Associated signs/symptoms: nausea, occasionally seeing things move randomly (rare), worsening anxiety due to work  Denies: bladder incontinence and bowel incontinence, light and noise sensitivity     Patient has tried:  -Excedrin/motirin - see below, within last 3 months taking; 15+ days a month   -Sumatriptan - 100mg PRN, 50% aborting headache with decrease efficacy now, taken with midol/excedrin with improve efficacy, taken appropriately, caused lightheadedness " "(mild) - was taking 10 pills within 14 days  -Topiramate - taken for few months, daily dose 100mg at night for at least multiple months with intermittent efficacy? He had irregular migraines - caused \"little to no emotion\" on the medication, less verbal and made depression worse, suicidal thoughts. Stopped 10/2018.   -Took an AED for a month of unknown type  -Acetominophen causes mild flushing and itchiness  -Depakote prescribed by me, April 2019, caused intermittent nausea migraine, weaned off April 2019  -Amitriptyline -cause abnormal movements requiring short-term leave from work,  Prescribed by me April 2019, weaned off in April/May 2019    Subjective: Patient was last seen in neurology clinic on May 8, 2019 by me regarding involuntary movements suspected secondary to amitriptyline.  At that time he stated that he cannot go to work with a 6-8 pain associated with this.  His symptoms at that time were present without migraine.  He was asked to wean off amitriptyline at that time.    He has been having trouble sleeping. His  said it may be a side effect of the amitripytline. He is off amitryptline since end of may. States he has had an intermittent migraine since 5/7/19 waxing and waning. Pain is 2-7 severity. Spasms have improved though still present. He has been getting \"pre numbness\" in his extremities in the fingers and sometimes followed by a spasm always in different spots all over lasting for < 10 minutes.     He hasn't taken any sumatriptan. He is taking 2 Excedrins every few days if needed. Aleve as well. He is on intermittent leave.     Past medical history:   Past Medical History:   Diagnosis Date   • H/O juvenile rheumatoid arthritis 4/2/2018   • Migraine with aura and without status migrainosus, not intractable 4/2/2018   • Muscle pain, myofascial 11/6/2018   • Nonintractable generalized idiopathic epilepsy without status epilepticus (HCC) 4/2/2018   • Recurrent major depressive disorder, in " remission ([facility]) 3/13/2019     Past surgical history:   History reviewed. No pertinent surgical history.    Family history:   Family History   Problem Relation Age of Onset   • Lung Disease Mother         COPD in a smoker   • Other Mother         epilepsy (late 30s), migraines    • Arthritis Mother    • No Known Problems Sister        Social history:   Social History   • Marital status: Single     Social History Main Topics   • Smoking status: Former Smoker     Years: 4.00     Types: Cigarettes     Quit date: 5/28/2017   • Smokeless tobacco: Never Used      Comment: Former Hookah   • Alcohol use No      Comment: Rarely   • Drug use: No   • Sexual activity: Not Currently     Partners: Male      Comment: single with SO,  at the [employer]     Current medications:   Current Outpatient Prescriptions   Medication   • Erenumab (AIMOVIG) 70 MG/ML Solution Auto-injector   • Magnesium 500 MG Tab   • omeprazole (PRILOSEC) 20 MG delayed-release capsule   • Diclofenac Sodium 1 % Gel   • diclofenac EC (VOLTAREN) 75 MG Tablet Delayed Response   • amitriptyline (ELAVIL) 10 MG Tab   • asa/apap/caffeine (EXCEDRIN) 250-250-65 MG Tab     No current facility-administered medications for this visit.        Medication Allergy:  Allergies   Allergen Reactions   • Acetaminophen    • Amitriptyline      Abnormal movements   • Other Drug      medrisil       Review of Systems   Constitutional: Negative for fever and weight loss.   HENT: Negative for sore throat.    Eyes: Negative for discharge.   Respiratory: Negative for shortness of breath.    Cardiovascular: Negative for leg swelling.   Gastrointestinal: Negative for abdominal pain.   Genitourinary: Negative for dysuria.   Musculoskeletal: Negative for falls.   Skin: Negative for rash.   Neurological:        As per HPI   Endo/Heme/Allergies: Does not bruise/bleed easily.   Psychiatric/Behavioral: Negative for hallucinations.       Physical examination:   Vitals:    06/03/19 1342  "  BP: (!) 94/50   BP Location: Left arm   Patient Position: Sitting   BP Cuff Size: Adult   Pulse: 70   Resp: 16   Temp: 36.6 °C (97.8 °F)   TempSrc: Temporal   SpO2: 95%   Weight: 62.1 kg (137 lb)   Height: 1.854 m (6' 1\")     General: Patient is thin in no apparent distress.  HENT: Normocephalic, atraumatic.   Cardiovascular: No lower extremity edema.  Respiratory: Normal respiratory effort.   Skin: No appreciable signs of acute rashes or bruising.   Musculoskeletal: No signs of joint or muscle swelling.  Limited range of motion of the bilateral wrists.  He has deformity at the MCP joints bilaterally.  Psychiatric: Pleasant.     NEUROLOGICAL EXAM:   Mental status: Awake, alert and fully oriented to person, place, time and situation. Normal attention, concentration and fund of knowledge for education level.   Speech and language: Speech is fluent without errors than occasional stuttering  Cranial nerve exam:  III, IV, VI: Conjugate gaze no ptosis.  VII: Facial movements are symmetrical. There is no facial droop.   VIII: Hearing intact to soft speech   IX: Dysarthria is not present.  XI: Shoulder shrug are symmetrical and strong with giveaway weakness within seconds.  XII: Tongue protrudes midline.    Motor exam:  Muscle tone is normal in all 4 limbs.  Patient has intermittent twitching of his torso to the left twice in 20 minutes.  He is moving all extremities equally today.    Muscle strength: He is able to give brief 5 out of 5 strength in the proximal upper and lower extremity though there is giveaway.  On proper exam.    Sensory exam:   Intact to Light touch in bilateral upper and lower extremity.    Deep tendon reflexes:       Right  Left  Biceps   trace/4  trace/4  Triceps  trace/4  trace/4  Brachioradialis trace/4  trace/4  Knee jerk  2/4  2/4  Ankle jerk  0/4  0/4     Coordination: shows a normal finger-nose-finger   Gait: Casual gait is normal.      ANCILLARY DATA REVIEWED:   Lab Data Review: EEG routine, " with video May 14, 2019: Normal, interpreted by me, patient had multiple right arm spasms and one body spasm without abnormal EEG correlate    ASSESSMENT AND PLAN:    1. Intractable chronic migraine without aura and without status migrainosus, uncontrolled: Patient's current episodes are likely intractable migraine with a component of medication overuse headache due to the high frequency of his over-the-counter medication use as well as triptan use that has since decreased in efficacy.  He does not snore, no focal exam findings, no other red flag symptoms to suggest secondary etiology.  There may be a component of somatization due to his worsening anxiety at work as well contributing.  Is not uncommon to have dizziness trouble concentrating and other symptoms associated with a severe migraine.  He does not have any hemiplegic or trigeminal cephalalgia type qualities to his headaches.  The suspicion for these prodromes are less likely seizures but given his history is warranting further workup to ensure there is no focal abnormality or change.  Due to the frequency of his headaches and goals to wean abortive medications, he is a good candidate for preventative therapy.  Topiramate caused suicidal ideation, Depakote caused nausea and vomiting, amitriptyline caused abnormal movements.  Patient's blood pressure may not be able to handle propranolol.    -He is now on intermittent leave for his headaches  -We reviewed the EEG, MRI brain with patient.   -We really discussed other headache treatment options including but not limited to CGRP antagonists and Botox, he is open to CGRP.    -Trial of Aimovig 70 mg subcu every 4 weeks, start low-dose due to patient's reactions to medications  -Headache diary    2. History of epilepsy, stable: Self-reported history of epilepsy not requiring AEDs at age 15.  Reportedly abnormal EEG, and MRI brain with and without contrast 2019 was normal.  He reportedly saw a neurologist.     -Monitor  -Patient is confused as to why he has a normal MRI brain and EEG in the setting of history of epilepsy.  I thoroughly explained to him that the EEG is a snapshot of time and that there may not be abnormal discharges at that time.  It is not uncommon to have a normal EEG and MRI brain in someone with seizure.    3.  Abnormal movements, improved: Differential includes medication related versus functional given the waxing and waning nature and inconsistencies of his symptoms.  They are related to increasing his dose of amitriptyline initially.  He reports he has a mild history of anxiety.  EEG in May 2019 demonstrated muscle spasms without EEG correlate.  Otherwise normal EEG routine, with video.  -Reviewed EEG results with patient  -Monitor  -Offered second opinion, patient on no comment    4. Depression/anxiety, stable: Patient reports stable depression without HI or SI but worsened anxiety secondary to work.  We discussed behavioral modifications as well as medications and therapy.  Currently he will continue to meditate.  He will seek additional help if needed.    5. Hx of juvenile RA: In remission since teenage years, not on medications    FOLLOW-UP: Return for 3-4 months.  To evaluate response to Aimovig  EDUCATION AND COUNSELING:  -I personally discussed the following with the patient:   Risks/benefits/side effects/alternatives of medication including but not limited to sedation, hypersensitivity reactions including rash (which may be fatal), GI side effects (gastritis, ulcers, bleeding, changes in appetite, pancreatitis, change in bowel habits) and Local site reactions, extreme fatigue, constipation, anaphylaxis., Diagnosis, prognosis, and treatment options discussed with patient at length.  , Discussed healthy lifestyle, including: healthy diet (rich in fruits, vegetables, nuts and healthy oils); proper hydration, and adequate sleep hygiene (allowing 7-8 hrs of overnight sleep)., Recommend regular  exercise, proper hydration, healthy diet and stress reduction. , Recommend improvement of sleep hygiene, including a structured schedule. Allow 7-8 hrs of overnight sleep. Avoid daytime naps. , Risks/benefits/side effects/alternatives to treatment of chronic intractable migraine including but not limited to side effects to Botox (bruising and injection site reactions, facial asymmetry, trouble swallowing, head drop, systemic side effects such as shortness of breath which can be life-threatening) and Discussed treatment expectations.Discussed medication overuse headaches.    The patient understands and agrees that due to the complexity of his/her diagnosis, results of any testing and further recommendations will typically be discussed/made during a face to face encounter in my office. The patient and/or family further understands it is their responsibility to keep proper follow up.     Disclaimer  This dictation was created using voice recognition software. I have made every reasonable attempt to avoid dictation errors, but this document may contain an error not identified before finalizing. If the error changes the accuracy of the document, I would appreciate it being brought to my attention. Thank you very much.     I spent 25 minutes face-to-face in which greater than 50% of the visit was spent in counseling/coordination of care as detailed above.       Breonna Galarza MD  Neurology, Neurophysiology  [facility]

## 2019-06-04 ENCOUNTER — PATIENT MESSAGE (OUTPATIENT)
Dept: MEDICAL GROUP | Facility: MEDICAL CENTER | Age: 34
End: 2019-06-04

## 2019-06-05 ENCOUNTER — PATIENT MESSAGE (OUTPATIENT)
Dept: MEDICAL GROUP | Facility: MEDICAL CENTER | Age: 34
End: 2019-06-05

## 2019-06-05 DIAGNOSIS — R51.9 CHRONIC INTRACTABLE HEADACHE, UNSPECIFIED HEADACHE TYPE: ICD-10-CM

## 2019-06-05 DIAGNOSIS — G89.29 CHRONIC INTRACTABLE HEADACHE, UNSPECIFIED HEADACHE TYPE: ICD-10-CM

## 2019-06-05 DIAGNOSIS — R25.3 MUSCLE TWITCHING: ICD-10-CM

## 2019-06-05 NOTE — TELEPHONE ENCOUNTER
From: Sidney Culver  To: Adelina Pearce M.D.  Sent: 6/4/2019 6:59 PM PDT  Subject: Non-Urgent Medical Question    It has been brought to my attention that hyperthyroidism covers a number of the symptoms I have been suffering. Do you think it might be possible that that may be the source of the issues? Are there tests or blood panels that can be run to detect that?

## 2019-06-06 NOTE — TELEPHONE ENCOUNTER
From: Sidney Culver  To: Adelina Pearce M.D.  Sent: 6/5/2019 10:01 AM PDT  Subject: Non-Urgent Medical Question    I have a long term disability option through my insurance. I will speak with them and see what sort of medical information they would require to start a claim. Unfortunately, given the results of all the tests run so far, there aren't any indicators as to the source of the problem. My EEG was normal despite the fact that I was having a spasm attack that day, all through the test. I am currently working with Dr. Galarza on seeking a second opinion in the event that a detail went unnoticed, but I would also like to know if you have any other insights into the matter.  ----- Message -----  From: Adelina Pearce M.D.  Sent: 6/5/2019 9:05 AM PDT  To: Sidney Culver  Subject: RE: Non-Urgent Medical Question  You have to have long term disability insurance or you are having to apply to Social Security for Medicare.  M    ----- Message -----   From: Sidney Culver   Sent: 6/4/2019 2:40 PM PDT   To: Adelina Pearce M.D.  Subject: Non-Urgent Medical Question    I am looking into the option of long term disability, as my condition seems only to be getting worse. (Dr. Galarza has authorized a new prescription for the migraines, and hopefully it works, but my track record with medications on this matter has been poor). I was wondering if you might be able to enlighten me as to where that process starts and how I can advocate for myself on the matter.

## 2019-06-14 ENCOUNTER — PATIENT MESSAGE (OUTPATIENT)
Dept: MEDICAL GROUP | Facility: MEDICAL CENTER | Age: 34
End: 2019-06-14

## 2019-06-14 NOTE — PATIENT COMMUNICATION
Phone Number Called: [phone] (home)      Call outcome: spoke to patient regarding message below    Message: Spoke with patient and he will be coming on on June 25th at 9am.

## 2019-06-14 NOTE — TELEPHONE ENCOUNTER
From: Sidney Culver  To: Adelina Pearce M.D.  Sent: 6/14/2019 10:09 AM PDT  Subject: Non-Urgent Medical Question    I spoke with [employer] just now, and they said they would be able to fax over the documents required if I were to call them during an appointment. If an opening is still available for today, could we move up the one currently scheduled?  ----- Message -----  From: Adelina Pearce M.D.  Sent: 6/14/2019 9:18 AM PDT  To: Sidney Culver  Subject: RE: Non-Urgent Medical Question  Sidney,  I will have Anne call you and we will have you come in with the paperwork to complete. We can try to get it done.  Thank you,  Dr. BRADY      ----- Message -----   From: Sidney Culver   Sent: 6/14/2019 5:12 AM PDT   To: Adelina Pearce M.D.  Subject: Non-Urgent Medical Question    Between my migraines, muscle spasms, and insomnia (recently developed while weaning off Amitriptyline) I am unable to work. Dr. Galarza does not sign off on leave for migraines, but said I should contact you on the matter instead. I just received the approval paperwork for the next medication we are trying, which I need to take in to the pharmacy, but while we test out this new medication, I cannot be certain whether or not it will land me in a good or bad state. Is there any way I can get on the short term disability with your sign off so I can start working to pay off my medical bills and general life expenses?

## 2019-06-18 ENCOUNTER — OFFICE VISIT (OUTPATIENT)
Dept: MEDICAL GROUP | Facility: MEDICAL CENTER | Age: 34
End: 2019-06-18
Payer: COMMERCIAL

## 2019-06-18 VITALS
WEIGHT: 135.14 LBS | OXYGEN SATURATION: 98 % | SYSTOLIC BLOOD PRESSURE: 100 MMHG | RESPIRATION RATE: 14 BRPM | HEIGHT: 73 IN | HEART RATE: 86 BPM | BODY MASS INDEX: 17.91 KG/M2 | DIASTOLIC BLOOD PRESSURE: 62 MMHG | TEMPERATURE: 98.2 F

## 2019-06-18 DIAGNOSIS — G43.109 MIGRAINE WITH AURA AND WITHOUT STATUS MIGRAINOSUS, NOT INTRACTABLE: ICD-10-CM

## 2019-06-18 DIAGNOSIS — M79.18 MUSCLE PAIN, MYOFASCIAL: ICD-10-CM

## 2019-06-18 PROCEDURE — 99214 OFFICE O/P EST MOD 30 MIN: CPT | Performed by: FAMILY MEDICINE

## 2019-06-18 NOTE — ASSESSMENT & PLAN NOTE
This has been a problem for this patient the may have originally started with his migraine headaches but now he has muscle spasms and myofascial pain even when he does not have a headache.  It appears that he is developed some type of a muscle spasm problem.  He is in the process of a work-up through the neurologist.  This does make it impossible for him to do his regular job because he has heavy lifting at least once a day where he has to lift something the equivalent of his weight.  His muscle spasms make this impossible.  
This is a chronic health problem for this patient that we have been unable to get under good control since approximately the beginning of January 2019.  Patient has been working with the neurologist since May 2019.  They are trying new medications to see if they can finally relieve his chronic migraine headaches.  Patient finally was able to obtain a new medication that is an injection that supposed to work for the coming month.  He is only just taking it and has seen no improvement.  Patient also very concerned about side effects from medications which she is had in the past.  We will complete his short-term disability paperwork today.  His next planned visit with his neurologist is 10/30/2019.    This patient has been having a daily headache since 5/7/2019.  His pain can be anywhere from a level of 2/10 up to a level of 7/10.  Once the level gets beyond 5/10 he develops nausea.  He is also heat intolerant.  Patient finds that if he utilizes over-the-counter magnesium, Aleve and something with sugar and something with caffeine along with the new injection Aimovig, he can get his pain down to a 2-3/10.  The thing that keeps him from being able to work a regular schedule is the muscle spasms which are still being investigated.  
Natacha

## 2019-06-18 NOTE — PROGRESS NOTES
CC:Diagnoses of Migraine with aura and without status migrainosus, not intractable and Muscle pain, myofascial were pertinent to this visit.    HISTORY OF PRESENT ILLNESS: Patient is a 34 y.o. male established patient who presents today to get his short-term disability extended.  This patient has been seeing Dr. Breonna Galarza, [facility] since 5/8/2019.  She has weaned him off of some of his older medications and is trying a new medication called Aimovig.  Patient is concerned because he has not been able to be migraine free since 5/7/2019.  In addition the patient has developed muscle spasms which may have been a side effect of being on amitriptyline.  He is now weaned off of that completely and still having muscle spasms.  They are in the process of doing a work-up to see what might be causing this and what could be done to try and help it.  The patient's muscle spasms is what makes it absolutely impossible for him to work.  He does not know when he is going to have them and they we can him make it impossible for him to do his normal activities as part of his job.    Health Maintenance: Completed    Migraine with aura and without status migrainosus, not intractable  This is a chronic health problem for this patient that we have been unable to get under good control since approximately the beginning of January 2019.  Patient has been working with the neurologist since May 2019.  They are trying new medications to see if they can finally relieve his chronic migraine headaches.  Patient finally was able to obtain a new medication that is an injection that supposed to work for the coming month.  He is only just taking it and has seen no improvement.  Patient also very concerned about side effects from medications which she is had in the past.  We will complete his short-term disability paperwork today.  His next planned visit with his neurologist is 10/30/2019.    This patient has been having a daily headache  since 5/7/2019.  His pain can be anywhere from a level of 2/10 up to a level of 7/10.  Once the level gets beyond 5/10 he develops nausea.  He is also heat intolerant.  Patient finds that if he utilizes over-the-counter magnesium, Aleve and something with sugar and something with caffeine along with the new injection Aimovig, he can get his pain down to a 2-3/10.  The thing that keeps him from being able to work a regular schedule is the muscle spasms which are still being investigated.    Muscle pain, myofascial  This has been a problem for this patient the may have originally started with his migraine headaches but now he has muscle spasms and myofascial pain even when he does not have a headache.  It appears that he is developed some type of a muscle spasm problem.  He is in the process of a work-up through the neurologist.  This does make it impossible for him to do his regular job because he has heavy lifting at least once a day where he has to lift something the equivalent of his weight.  His muscle spasms make this impossible.      Patient Active Problem List    Diagnosis Date Noted   • Recurrent major depressive disorder, in remission (HCC) 03/13/2019   • Muscle pain, myofascial 11/06/2018   • Migraine with aura and without status migrainosus, not intractable 04/02/2018   • H/O juvenile rheumatoid arthritis 04/02/2018   • Nonintractable generalized idiopathic epilepsy without status epilepticus ([facility]) 04/02/2018      Allergies:Acetaminophen; Amitriptyline; and Other drug    Current Outpatient Prescriptions   Medication Sig Dispense Refill   • Erenumab (AIMOVIG) 70 MG/ML Solution Auto-injector Inject 1 mL as instructed Q 4 Weeks. 1 PEN 4   • Magnesium 500 MG Tab Taking 1 daily and then 1 with the onset of a migraine. 100 Tab 3   • asa/apap/caffeine (EXCEDRIN) 250-250-65 MG Tab Take 1 Tab by mouth every 6 hours as needed for Headache.     • omeprazole (PRILOSEC) 20 MG delayed-release capsule Take 1 Cap by mouth  every day. 90 Cap 3   • Diclofenac Sodium 1 % Gel Apply to painful muscles up to qid 200 g 11   • diclofenac EC (VOLTAREN) 75 MG Tablet Delayed Response TAKE 1 TABLET BY MOUTH TWICE DAILY 180 Tab 3     No current facility-administered medications for this visit.        Social History   Substance Use Topics   • Smoking status: Former Smoker     Years: 4.00     Types: Cigarettes     Quit date: 5/28/2017   • Smokeless tobacco: Never Used      Comment: Former Hookah   • Alcohol use No      Comment: Rarely     Social History     Social History Narrative   • No narrative on file       Family History   Problem Relation Age of Onset   • Lung Disease Mother         COPD in a smoker   • Other Mother         epilepsy (late 30s), migraines    • Arthritis Mother    • No Known Problems Sister         ROS:     - Constitutional: Patient has daily fatigue due to poor sleep and generalized weakness.     - HEENT:  Negative for headaches, vision changes, hearing changes, ear pain, ear discharge, rhinorrhea, sinus congestion, sore throat, and neck pain.      - Respiratory: Negative for cough, sputum production, chest congestion, dyspnea, wheezing, and crackles.      - Cardiovascular: Negative for chest pain, palpitations, orthopnea, and bilateral lower extremity edema.     - Gastrointestinal: Negative for heartburn, nausea, vomiting, abdominal pain, hematochezia, melena, diarrhea, constipation, and greasy/foul-smelling stools.     - Genitourinary: Negative for dysuria, polyuria, hematuria, pyuria, urinary urgency, and urinary incontinence.     - Musculoskeletal: Patient has complicated muscle spasms that occur frequently and without notice.  Patient does not necessarily have joint pain but does notice pain in this tendons prior to developing the muscle spasms.      - Skin: Negative for rash, itching, cyanotic skin color change.     - Neurological: Chronic migraine ongoing since 5/7/2019.      - Endo/Heme/Allergies: Does not bruise/bleed  "easily.     - Psychiatric/Behavioral: Patient has noted increased stress due to not working in the financial considerations of this.  His depression seems to be adequately controlled.  Denies suicidal or homicidal ideation.           - NOTE: All other systems reviewed and are negative, except as in HPI.      Exam:    /62 (BP Location: Left arm, Patient Position: Sitting, BP Cuff Size: Adult)   Pulse 86   Temp 36.8 °C (98.2 °F) (Temporal)   Resp 14   Ht 1.854 m (6' 1\")   Wt 61.3 kg (135 lb 2.3 oz)   SpO2 98%  Body mass index is 17.83 kg/m².    General:  Well nourished, well developed male in NAD  Head is grossly normal.    Patient was seen for 25 minutes face to face of which, 20 minutes was spent counseling regarding completion of short-term disability paperwork for his extension also helping him to complete medical records request so he can get his records from his neurologist also sent to [employer]..    Please note that this dictation was created using voice recognition software. I have made every reasonable attempt to correct obvious errors, but I expect that there are errors of grammar and possibly content that I did not discover before finalizing the note.    Assessment/Plan:  1. Migraine with aura and without status migrainosus, not intractable  Uncontrolled, patient trying new medication regime to see if this will be helpful.  He is only just now started on that new medication because it had to be approved by insurance.  At 5 days out patient has not noted much in the way of improvement.    2. Muscle pain, myofascial  Uncontrolled, patient still in the process of a work-up to try and determine why he is having muscle spasms and how we can possibly help that.         "

## 2019-07-02 ENCOUNTER — TELEPHONE (OUTPATIENT)
Dept: NEUROLOGY | Facility: MEDICAL CENTER | Age: 34
End: 2019-07-02

## 2019-07-03 ENCOUNTER — PATIENT MESSAGE (OUTPATIENT)
Dept: MEDICAL GROUP | Facility: MEDICAL CENTER | Age: 34
End: 2019-07-03

## 2019-07-03 ENCOUNTER — HOSPITAL ENCOUNTER (OUTPATIENT)
Dept: RADIOLOGY | Facility: MEDICAL CENTER | Age: 34
End: 2019-07-03
Attending: SPECIALIST
Payer: COMMERCIAL

## 2019-07-03 DIAGNOSIS — Z87.39 HISTORY OF RHEUMATOID ARTHRITIS: ICD-10-CM

## 2019-07-03 DIAGNOSIS — G25.3 MYOCLONUS: ICD-10-CM

## 2019-07-03 DIAGNOSIS — R25.2 SPASTICITY: ICD-10-CM

## 2019-07-03 DIAGNOSIS — G44.89 OTHER HEADACHE SYNDROME: ICD-10-CM

## 2019-07-03 PROCEDURE — 72141 MRI NECK SPINE W/O DYE: CPT

## 2019-07-03 NOTE — TELEPHONE ENCOUNTER
From: Sidney Culver  To: Adelina Pearce M.D.  Sent: 7/3/2019 8:32 AM PDT  Subject: Non-Urgent Medical Question    Dr. Galarza has informed me that she is unwilling to fill out the functionality form which has been faxed to her twice over the past two weeks. I spoke with [employer], and asked if they would be able to fax it over to your office for completion. He told me it should be about 20 minutes to arrive at your office, and I just got off the phone with him. This is the last form required to approve an extension for the short term leave out to October 30th.

## 2019-07-05 ENCOUNTER — PATIENT MESSAGE (OUTPATIENT)
Dept: MEDICAL GROUP | Facility: MEDICAL CENTER | Age: 34
End: 2019-07-05

## 2019-07-06 ENCOUNTER — HOSPITAL ENCOUNTER (OUTPATIENT)
Dept: LAB | Facility: MEDICAL CENTER | Age: 34
End: 2019-07-06
Attending: SPECIALIST
Payer: COMMERCIAL

## 2019-07-06 LAB
ALBUMIN SERPL BCP-MCNC: 4.4 G/DL (ref 3.2–4.9)
ALBUMIN/GLOB SERPL: 1.3 G/DL
ALP SERPL-CCNC: 60 U/L (ref 30–99)
ALT SERPL-CCNC: 13 U/L (ref 2–50)
ANION GAP SERPL CALC-SCNC: 8 MMOL/L (ref 0–11.9)
AST SERPL-CCNC: 12 U/L (ref 12–45)
BASOPHILS # BLD AUTO: 1.2 % (ref 0–1.8)
BASOPHILS # BLD: 0.06 K/UL (ref 0–0.12)
BILIRUB SERPL-MCNC: 0.4 MG/DL (ref 0.1–1.5)
BUN SERPL-MCNC: 26 MG/DL (ref 8–22)
CALCIUM SERPL-MCNC: 9.8 MG/DL (ref 8.5–10.5)
CHLORIDE SERPL-SCNC: 106 MMOL/L (ref 96–112)
CK SERPL-CCNC: 42 U/L (ref 0–154)
CO2 SERPL-SCNC: 28 MMOL/L (ref 20–33)
CREAT SERPL-MCNC: 0.98 MG/DL (ref 0.5–1.4)
EOSINOPHIL # BLD AUTO: 0.11 K/UL (ref 0–0.51)
EOSINOPHIL NFR BLD: 2.2 % (ref 0–6.9)
ERYTHROCYTE [DISTWIDTH] IN BLOOD BY AUTOMATED COUNT: 41.6 FL (ref 35.9–50)
ERYTHROCYTE [SEDIMENTATION RATE] IN BLOOD BY WESTERGREN METHOD: 5 MM/HOUR (ref 0–15)
GLOBULIN SER CALC-MCNC: 3.3 G/DL (ref 1.9–3.5)
GLUCOSE SERPL-MCNC: 92 MG/DL (ref 65–99)
HCT VFR BLD AUTO: 42.6 % (ref 42–52)
HGB BLD-MCNC: 14.1 G/DL (ref 14–18)
IMM GRANULOCYTES # BLD AUTO: 0.01 K/UL (ref 0–0.11)
IMM GRANULOCYTES NFR BLD AUTO: 0.2 % (ref 0–0.9)
LYMPHOCYTES # BLD AUTO: 1.52 K/UL (ref 1–4.8)
LYMPHOCYTES NFR BLD: 29.8 % (ref 22–41)
MAGNESIUM SERPL-MCNC: 2.2 MG/DL (ref 1.5–2.5)
MCH RBC QN AUTO: 32.3 PG (ref 27–33)
MCHC RBC AUTO-ENTMCNC: 33.1 G/DL (ref 33.7–35.3)
MCV RBC AUTO: 97.5 FL (ref 81.4–97.8)
MONOCYTES # BLD AUTO: 0.4 K/UL (ref 0–0.85)
MONOCYTES NFR BLD AUTO: 7.8 % (ref 0–13.4)
NEUTROPHILS # BLD AUTO: 3 K/UL (ref 1.82–7.42)
NEUTROPHILS NFR BLD: 58.8 % (ref 44–72)
NRBC # BLD AUTO: 0 K/UL
NRBC BLD-RTO: 0 /100 WBC
PLATELET # BLD AUTO: 320 K/UL (ref 164–446)
PMV BLD AUTO: 10.2 FL (ref 9–12.9)
POTASSIUM SERPL-SCNC: 4.4 MMOL/L (ref 3.6–5.5)
PROT SERPL-MCNC: 7.7 G/DL (ref 6–8.2)
RBC # BLD AUTO: 4.37 M/UL (ref 4.7–6.1)
RHEUMATOID FACT SER IA-ACNC: <10 IU/ML (ref 0–14)
SODIUM SERPL-SCNC: 142 MMOL/L (ref 135–145)
T4 SERPL-MCNC: 9.9 UG/DL (ref 4–12)
VIT B12 SERPL-MCNC: 283 PG/ML (ref 211–911)
WBC # BLD AUTO: 5.1 K/UL (ref 4.8–10.8)

## 2019-07-06 PROCEDURE — 83735 ASSAY OF MAGNESIUM: CPT

## 2019-07-06 PROCEDURE — 36415 COLL VENOUS BLD VENIPUNCTURE: CPT

## 2019-07-06 PROCEDURE — 82607 VITAMIN B-12: CPT

## 2019-07-06 PROCEDURE — 85025 COMPLETE CBC W/AUTO DIFF WBC: CPT

## 2019-07-06 PROCEDURE — 84436 ASSAY OF TOTAL THYROXINE: CPT

## 2019-07-06 PROCEDURE — 85652 RBC SED RATE AUTOMATED: CPT

## 2019-07-06 PROCEDURE — 80053 COMPREHEN METABOLIC PANEL: CPT

## 2019-07-06 PROCEDURE — 82550 ASSAY OF CK (CPK): CPT

## 2019-07-06 PROCEDURE — 86038 ANTINUCLEAR ANTIBODIES: CPT

## 2019-07-06 PROCEDURE — 86431 RHEUMATOID FACTOR QUANT: CPT

## 2019-07-08 LAB — NUCLEAR IGG SER QL IA: NORMAL

## 2019-07-11 ENCOUNTER — PATIENT MESSAGE (OUTPATIENT)
Dept: MEDICAL GROUP | Facility: MEDICAL CENTER | Age: 34
End: 2019-07-11

## 2019-07-17 ENCOUNTER — TELEPHONE (OUTPATIENT)
Dept: MEDICAL GROUP | Facility: MEDICAL CENTER | Age: 34
End: 2019-07-17

## 2019-07-17 NOTE — TELEPHONE ENCOUNTER
VOICEMAIL  1. Caller Name: Sidney Culver                        Call Back Number: [phone] (home)      2. Message: Patient called and left a message asking about his [facility] paperwork. Do you have it? I will call the patient back and let him know what is going on if you would like.    Please let me know.   Thank you.     3. Patient approves office to leave a detailed voicemail/Yornt message: yes

## 2019-07-22 NOTE — TELEPHONE ENCOUNTER
From: Sidney Culver  To: Adelina Pearce M.D.  Sent: 7/11/2019 4:32 PM PDT  Subject: Non-Urgent Medical Question    Did you end up filling out the paperwork? My HR has not yet heard from [employer], and she needs me to expedite this process.  ----- Message -----  From: Adelina Pearce M.D.  Sent: 7/5/2019 1:58 PM PDT  To: Sidney Culver  Subject: RE: Non-Urgent Medical Question  It has arrived. I will look it over and let you know on Monday if I can sign for you.  Thank you,  Dr. BRADY      ----- Message -----   From: Sidney Culver   Sent: 7/5/2019 10:02 AM PDT   To: Adelina Pearce M.D.  Subject: Non-Urgent Medical Question    I had them re-fax the paperwork on Wednesday. I am hoping it came through over the holiday at least. Has it arrived?

## 2019-08-05 ENCOUNTER — OFFICE VISIT (OUTPATIENT)
Dept: MEDICAL GROUP | Facility: MEDICAL CENTER | Age: 34
End: 2019-08-05
Payer: COMMERCIAL

## 2019-08-05 VITALS
SYSTOLIC BLOOD PRESSURE: 98 MMHG | HEIGHT: 73 IN | RESPIRATION RATE: 12 BRPM | HEART RATE: 65 BPM | TEMPERATURE: 98 F | WEIGHT: 132.94 LBS | DIASTOLIC BLOOD PRESSURE: 66 MMHG | OXYGEN SATURATION: 96 % | BODY MASS INDEX: 17.62 KG/M2

## 2019-08-05 DIAGNOSIS — G40.309 NONINTRACTABLE GENERALIZED IDIOPATHIC EPILEPSY WITHOUT STATUS EPILEPTICUS (HCC): ICD-10-CM

## 2019-08-05 DIAGNOSIS — G43.109 MIGRAINE WITH AURA AND WITHOUT STATUS MIGRAINOSUS, NOT INTRACTABLE: ICD-10-CM

## 2019-08-05 DIAGNOSIS — M54.6 ACUTE MIDLINE THORACIC BACK PAIN: ICD-10-CM

## 2019-08-05 DIAGNOSIS — M79.18 MUSCLE PAIN, MYOFASCIAL: ICD-10-CM

## 2019-08-05 PROCEDURE — 99214 OFFICE O/P EST MOD 30 MIN: CPT | Performed by: FAMILY MEDICINE

## 2019-08-05 RX ORDER — DIAZEPAM 5 MG/1
5 TABLET ORAL 2 TIMES DAILY
Qty: 60 TAB | Refills: 0 | Status: SHIPPED | OUTPATIENT
Start: 2019-10-04 | End: 2019-08-30

## 2019-08-05 RX ORDER — GABAPENTIN 300 MG/1
300 CAPSULE ORAL 3 TIMES DAILY
Refills: 11 | COMMUNITY
Start: 2019-07-31 | End: 2019-09-27

## 2019-08-05 RX ORDER — DIAZEPAM 5 MG/1
5 TABLET ORAL 2 TIMES DAILY
Qty: 60 TAB | Refills: 0 | Status: SHIPPED | OUTPATIENT
Start: 2019-08-05 | End: 2019-08-05 | Stop reason: SDUPTHER

## 2019-08-05 RX ORDER — DIAZEPAM 5 MG/1
5 TABLET ORAL 2 TIMES DAILY
Qty: 60 TAB | Refills: 0 | Status: SHIPPED | OUTPATIENT
Start: 2019-09-04 | End: 2019-08-05 | Stop reason: SDUPTHER

## 2019-08-05 NOTE — ASSESSMENT & PLAN NOTE
This is a chronic health problem for this patient that he currently is on gabapentin 300 mg 3 times daily trying to decrease the muscle spasms that he is been having.  This is tolerated but not very effective.  In light of him having continued muscle spasms I am going to try Valium 5 mg twice daily to see if we can absolutely stop his muscle spasms completely.  Patient has had to be off from work because of the pain and muscle spasms making it impossible for do him to do his work as a .  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

## 2019-08-05 NOTE — ASSESSMENT & PLAN NOTE
Patient continues to have problems with generalized muscle spasms and muscle pain.  He has been seeing Dr. Wu, neurology for this who now has him on Neurontin 300 mg 3 times a day.  What he currently is experiencing in the thoracic spine seems to be muscle spasms as well as pain.  We will try utilizing a muscle relaxant to see if we can get him any relief.

## 2019-08-05 NOTE — ASSESSMENT & PLAN NOTE
This is a new problem for this patient started about 3 weeks ago where he is noticing that the back just below his rib cage is in severe muscle spasm that at its lowest it runs 3-4/10 and at its highest runs 7-9/10.  He cannot account for how this got started.  He did not particularly feel like he had lifted anything too heavy.  He states that the pain feels as if it runs across his back comes straight through his abdomen into his liver and up to her abdominal area.

## 2019-08-05 NOTE — PROGRESS NOTES
CC:Diagnoses of Acute midline thoracic back pain, Muscle pain, myofascial, Nonintractable generalized idiopathic epilepsy without status epilepticus (HCC), and Migraine with aura and without status migrainosus, not intractable were pertinent to this visit.    HISTORY OF PRESENT ILLNESS: Patient is a 34 y.o. male established patient who presents today to talk about his continued problems with muscle spasms, midline thoracic back pain and migraine headaches.  Patient has been off from work since 5/7/2019.  Unfortunately his short-term disability was discontinued because he could not get paperwork completed.  The neurologist have not felt like this was they are position to complete that paperwork.  I am seeing the patient today and completing paperwork for him that we will submit to [employer] which is where he gets his group short-term disability through.  We also have a plan of action of trying a new medication to help with this muscle spasms.  We will have to see if he can tolerate that medication and still be safe to operate machinery if he requires it long-term.    Health Maintenance: Completed    Acute midline thoracic back pain  This is a new problem for this patient started about 3 weeks ago where he is noticing that the back just below his rib cage is in severe muscle spasm that at its lowest it runs 3-4/10 and at its highest runs 7-9/10.  He cannot account for how this got started.  He did not particularly feel like he had lifted anything too heavy.  He states that the pain feels as if it runs across his back comes straight through his abdomen into his liver and up to her abdominal area.    Muscle pain, myofascial  Patient continues to have problems with generalized muscle spasms and muscle pain.  He has been seeing Dr. Wu, neurology for this who now has him on Neurontin 300 mg 3 times a day.  What he currently is experiencing in the thoracic spine seems to be muscle spasms as well as pain.   We will try utilizing a muscle relaxant to see if we can get him any relief.    Nonintractable generalized idiopathic epilepsy without status epilepticus (HCC)  This is a chronic health problem for this patient that he currently is on gabapentin 300 mg 3 times daily trying to decrease the muscle spasms that he is been having.  This is tolerated but not very effective.  In light of him having continued muscle spasms I am going to try Valium 5 mg twice daily to see if we can absolutely stop his muscle spasms completely.  Patient has had to be off from work because of the pain and muscle spasms making it impossible for do him to do his work as a .  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    Migraine with aura and without status migrainosus, not intractable  This is a chronic health problem for this patient that tryptophans did not seem to be helpful.  We then set him up to see Dr. Galarza who tried Aimovig.  Patient was able to only afford this for 1 month because he has lost his short-term disability.  That 1 months worth of treatment did not improve his frequency or intensity of migraines.      Patient Active Problem List    Diagnosis Date Noted   • Acute midline thoracic back pain 08/05/2019   • Recurrent major depressive disorder, in remission (HCC) 03/13/2019   • Muscle pain, myofascial 11/06/2018   • Migraine with aura and without status migrainosus, not intractable 04/02/2018   • H/O juvenile rheumatoid arthritis 04/02/2018   • Nonintractable generalized idiopathic epilepsy without status epilepticus (HCC) 04/02/2018      Allergies:Acetaminophen; Amitriptyline; and Other drug    Current Outpatient Medications   Medication Sig Dispense Refill   • gabapentin (NEURONTIN) 300 MG Cap Take 300 mg by mouth 3 times a day.  11   • [START ON  10/4/2019] diazePAM (VALIUM) 5 MG Tab Take 1 Tab by mouth 2 times a day for 30 days. 60 Tab 0   • Erenumab (AIMOVIG) 70 MG/ML Solution Auto-injector Inject 1 mL as instructed Q 4 Weeks. 1 PEN 4   • Magnesium 500 MG Tab Taking 1 daily and then 1 with the onset of a migraine. 100 Tab 3   • asa/apap/caffeine (EXCEDRIN) 250-250-65 MG Tab Take 1 Tab by mouth every 6 hours as needed for Headache.     • omeprazole (PRILOSEC) 20 MG delayed-release capsule Take 1 Cap by mouth every day. 90 Cap 3   • Diclofenac Sodium 1 % Gel Apply to painful muscles up to qid 200 g 11   • diclofenac EC (VOLTAREN) 75 MG Tablet Delayed Response TAKE 1 TABLET BY MOUTH TWICE DAILY 180 Tab 3     No current facility-administered medications for this visit.        Social History     Tobacco Use   • Smoking status: Former Smoker     Years: 4.00     Types: Cigarettes     Last attempt to quit: 2017     Years since quittin.1   • Smokeless tobacco: Never Used   • Tobacco comment: Former Hookah   Substance Use Topics   • Alcohol use: No     Comment: Rarely   • Drug use: No     Social History     Social History Narrative   • Not on file       Family History   Problem Relation Age of Onset   • Lung Disease Mother         COPD in a smoker   • Other Mother         epilepsy (late 30s), migraines    • Arthritis Mother    • No Known Problems Sister         ROS:     - Constitutional: Positive for continued fatigue and malaise and generalized weakness secondary to muscle spasms.     - HEENT:  Negative for headaches, vision changes, hearing changes, ear pain, ear discharge, rhinorrhea, sinus congestion, sore throat, and neck pain.      - Respiratory: Negative for cough, sputum production, chest congestion, dyspnea, wheezing, and crackles.      - Cardiovascular: Negative for chest pain, palpitations, orthopnea, and bilateral lower extremity edema.     - Gastrointestinal: Positive for nausea secondary to pain otherwise denies GI problems.      -  "Genitourinary: Negative for dysuria, polyuria, hematuria, pyuria, urinary urgency, and urinary incontinence.     - Musculoskeletal: Positive for muscle spasms throughout his body times where his back will spasm to the point that he is in severe pain other times he will end up getting muscle spasms in extremity and be unable to utilize that extremity.      - Skin: Negative for rash, itching, cyanotic skin color change.     - Neurological: Unexplained muscle spasms that do not coincide with abnormalities on EEG.  Does not appear this is part of his epilepsy but undiagnosed at this time.      - Endo/Heme/Allergies: Does not bruise/bleed easily.     - Psychiatric/Behavioral: Positive for depression with some suicidal ideation although he does not have a plan.  Patient states that the suicidal ideas, and the pain is so severe and uncontrolled.  No homicidal ideation.           - NOTE: All other systems reviewed and are negative, except as in HPI.      Exam:    BP (!) 98/66 (BP Location: Left arm, Patient Position: Sitting, BP Cuff Size: Adult)   Pulse 65   Temp 36.7 °C (98 °F) (Temporal)   Resp 12   Ht 1.854 m (6' 1\")   Wt 60.3 kg (132 lb 15 oz)   SpO2 96%  Body mass index is 17.54 kg/m².    General: Thin gentleman with obvious changes to his joints from rheumatoid arthritis, appears to be in pain.  .  Neck: Supple without JVD or bruit. Thyroid is not enlarged.  Back: Spinous processes are in good alignment and nontender to palpation.  There is paraspinous muscle spasms throughout the back which spread across from the midline laterally..    Please note that this dictation was created using voice recognition software. I have made every reasonable attempt to correct obvious errors, but I expect that there are errors of grammar and possibly content that I did not discover before finalizing the note.    Assessment/Plan:  1. Acute midline thoracic back pain  Uncontrolled.  Patient signed a controlled substance agreement " we will try diazepam 5 mg twice daily.  We will see him back within 4 weeks to see if that has helped or if we need to go up on the treatment.  He is cautioned that this is a controlled substance and is addicting.  Patient cannot utilize this and operate machinery.  - Controlled Substance Treatment Agreement    2. Muscle pain, myofascial  Plan as per #1 above  - diazePAM (VALIUM) 5 MG Tab; Take 1 Tab by mouth 2 times a day for 30 days.  Dispense: 60 Tab; Refill: 0  - Controlled Substance Treatment Agreement    3. Nonintractable generalized idiopathic epilepsy without status epilepticus (HCC)  Unknown control, patient working with neurology  - Controlled Substance Treatment Agreement    4. Migraine with aura and without status migrainosus, not intractable  Uncontrolled, still have not found a medication to help his migraine headaches.

## 2019-08-05 NOTE — ASSESSMENT & PLAN NOTE
This is a chronic health problem for this patient that tryptophans did not seem to be helpful.  We then set him up to see Dr. Galarza who tried Aimovig.  Patient was able to only afford this for 1 month because he has lost his short-term disability.  That 1 months worth of treatment did not improve his frequency or intensity of migraines.

## 2019-08-06 ENCOUNTER — HOSPITAL ENCOUNTER (EMERGENCY)
Facility: MEDICAL CENTER | Age: 34
End: 2019-08-06
Attending: EMERGENCY MEDICINE
Payer: COMMERCIAL

## 2019-08-06 VITALS
RESPIRATION RATE: 16 BRPM | WEIGHT: 132.5 LBS | DIASTOLIC BLOOD PRESSURE: 70 MMHG | TEMPERATURE: 97.5 F | HEART RATE: 64 BPM | OXYGEN SATURATION: 96 % | HEIGHT: 71 IN | SYSTOLIC BLOOD PRESSURE: 97 MMHG | BODY MASS INDEX: 18.55 KG/M2

## 2019-08-06 DIAGNOSIS — R10.9 ABDOMINAL PAIN, UNSPECIFIED ABDOMINAL LOCATION: ICD-10-CM

## 2019-08-06 LAB
ALBUMIN SERPL BCP-MCNC: 4.6 G/DL (ref 3.2–4.9)
ALBUMIN/GLOB SERPL: 1.5 G/DL
ALP SERPL-CCNC: 54 U/L (ref 30–99)
ALT SERPL-CCNC: <5 U/L (ref 2–50)
ANION GAP SERPL CALC-SCNC: 11 MMOL/L (ref 0–11.9)
APPEARANCE UR: CLEAR
AST SERPL-CCNC: 12 U/L (ref 12–45)
BASOPHILS # BLD AUTO: 1.5 % (ref 0–1.8)
BASOPHILS # BLD: 0.09 K/UL (ref 0–0.12)
BILIRUB SERPL-MCNC: 0.4 MG/DL (ref 0.1–1.5)
BILIRUB UR QL STRIP.AUTO: NEGATIVE
BUN SERPL-MCNC: 14 MG/DL (ref 8–22)
CALCIUM SERPL-MCNC: 9.2 MG/DL (ref 8.5–10.5)
CHLORIDE SERPL-SCNC: 113 MMOL/L (ref 96–112)
CO2 SERPL-SCNC: 24 MMOL/L (ref 20–33)
COLOR UR: YELLOW
CREAT SERPL-MCNC: 1.32 MG/DL (ref 0.5–1.4)
EKG IMPRESSION: NORMAL
EOSINOPHIL # BLD AUTO: 0.1 K/UL (ref 0–0.51)
EOSINOPHIL NFR BLD: 1.7 % (ref 0–6.9)
ERYTHROCYTE [DISTWIDTH] IN BLOOD BY AUTOMATED COUNT: 40.1 FL (ref 35.9–50)
GLOBULIN SER CALC-MCNC: 3 G/DL (ref 1.9–3.5)
GLUCOSE SERPL-MCNC: 80 MG/DL (ref 65–99)
GLUCOSE UR STRIP.AUTO-MCNC: NEGATIVE MG/DL
HCT VFR BLD AUTO: 41.8 % (ref 42–52)
HGB BLD-MCNC: 13.8 G/DL (ref 14–18)
IMM GRANULOCYTES # BLD AUTO: 0.01 K/UL (ref 0–0.11)
IMM GRANULOCYTES NFR BLD AUTO: 0.2 % (ref 0–0.9)
KETONES UR STRIP.AUTO-MCNC: NEGATIVE MG/DL
LEUKOCYTE ESTERASE UR QL STRIP.AUTO: NEGATIVE
LIPASE SERPL-CCNC: 7 U/L (ref 11–82)
LYMPHOCYTES # BLD AUTO: 1.65 K/UL (ref 1–4.8)
LYMPHOCYTES NFR BLD: 27.5 % (ref 22–41)
MCH RBC QN AUTO: 32.1 PG (ref 27–33)
MCHC RBC AUTO-ENTMCNC: 33 G/DL (ref 33.7–35.3)
MCV RBC AUTO: 97.2 FL (ref 81.4–97.8)
MICRO URNS: NORMAL
MONOCYTES # BLD AUTO: 0.56 K/UL (ref 0–0.85)
MONOCYTES NFR BLD AUTO: 9.3 % (ref 0–13.4)
NEUTROPHILS # BLD AUTO: 3.6 K/UL (ref 1.82–7.42)
NEUTROPHILS NFR BLD: 59.8 % (ref 44–72)
NITRITE UR QL STRIP.AUTO: NEGATIVE
NRBC # BLD AUTO: 0 K/UL
NRBC BLD-RTO: 0 /100 WBC
PH UR STRIP.AUTO: 5 [PH] (ref 5–8)
PLATELET # BLD AUTO: 331 K/UL (ref 164–446)
PMV BLD AUTO: 10.1 FL (ref 9–12.9)
POTASSIUM SERPL-SCNC: 4.3 MMOL/L (ref 3.6–5.5)
PROT SERPL-MCNC: 7.6 G/DL (ref 6–8.2)
PROT UR QL STRIP: NEGATIVE MG/DL
RBC # BLD AUTO: 4.3 M/UL (ref 4.7–6.1)
RBC UR QL AUTO: NEGATIVE
SODIUM SERPL-SCNC: 148 MMOL/L (ref 135–145)
SP GR UR STRIP.AUTO: 1.02
UROBILINOGEN UR STRIP.AUTO-MCNC: 0.2 MG/DL
WBC # BLD AUTO: 6 K/UL (ref 4.8–10.8)

## 2019-08-06 PROCEDURE — 85025 COMPLETE CBC W/AUTO DIFF WBC: CPT

## 2019-08-06 PROCEDURE — 81003 URINALYSIS AUTO W/O SCOPE: CPT

## 2019-08-06 PROCEDURE — A9270 NON-COVERED ITEM OR SERVICE: HCPCS | Performed by: EMERGENCY MEDICINE

## 2019-08-06 PROCEDURE — 83690 ASSAY OF LIPASE: CPT

## 2019-08-06 PROCEDURE — 80053 COMPREHEN METABOLIC PANEL: CPT

## 2019-08-06 PROCEDURE — 99284 EMERGENCY DEPT VISIT MOD MDM: CPT

## 2019-08-06 PROCEDURE — 93005 ELECTROCARDIOGRAM TRACING: CPT | Performed by: STUDENT IN AN ORGANIZED HEALTH CARE EDUCATION/TRAINING PROGRAM

## 2019-08-06 PROCEDURE — 700102 HCHG RX REV CODE 250 W/ 637 OVERRIDE(OP): Performed by: EMERGENCY MEDICINE

## 2019-08-06 RX ORDER — FAMOTIDINE 20 MG/1
20 TABLET, FILM COATED ORAL 2 TIMES DAILY
Qty: 60 TAB | Refills: 0 | Status: SHIPPED | OUTPATIENT
Start: 2019-08-06 | End: 2019-12-23 | Stop reason: SDUPTHER

## 2019-08-06 RX ADMIN — LIDOCAINE HYDROCHLORIDE 30 ML: 20 SOLUTION OROPHARYNGEAL at 21:16

## 2019-08-07 NOTE — ED NOTES
Discharge instructions gone over with pt. Pt verbalized understanding. All questions answered. Pt educated on s/sx to return to ED. Pt educated on prescription given. Pt educated to f/u with PCP as needed. Pt left ambulatory with steady gait.

## 2019-08-07 NOTE — ED PROVIDER NOTES
ER Provider Note     Scribed for Dain Bruce M.D. by Bimal Engle. 8/6/2019, 8:32 PM.    Primary Care Provider: Adelina Pearce M.D.  Means of Arrival: Walk-in   History obtained from: Patient  History limited by: None     CHIEF COMPLAINT  Chief Complaint   Patient presents with   • Abdominal Pain     x3-4 weeks, generalized upper abdominal pain, rated 7/10        HPI  Sidney Culver is a 34 y.o. male who presents to the Emergency Department with interminttent, worsening upper abdominal pain onset 3-4 weeks ago. The patient states that when his pain originally started it was more intermittent and manageable with ice packs applied on his back, but now his pain spikes up to a 9/10 in severity and lasts up to a day without resolution. He describes the pain as sharp in quality and occasionally moves to his lower abdomen. The patient states that his prescribed gabapentin as well as ibuprofen provides mild alleviation to his abdominal pain. His latest dosage of gabapentin was 2 hours ago. His current episode of abdominal pain started around 3 hours ago and rib pain started 5 minutes ago. The patient follows with his PCP and neurologist for chronic migraines and convulsions, and notes that yesterday his PCP suggested his pain could be related for convulsions. Endorses associated nausea and shortness of breath secondary to pain as well as rib pain that intermittently shoots down his arm into his elbow. Denies any associated numbness or tingling, unexplained weight loss, rashes, diarrhea, constipation, vomiting, melena, or hematochezia. He notes that recently he has been eating more fruit and less meat, but denies any significant dietary changes. Additionally denies any past abdominal surgeries, drug use, alcohol use, or recent tobacco usage.     REVIEW OF SYSTEMS  See HPI for further details. All other systems are negative.     PAST MEDICAL HISTORY   has a past medical history of Acute midline thoracic  "back pain (2019), H/O juvenile rheumatoid arthritis (2018), Migraine with aura and without status migrainosus, not intractable (2018), Muscle pain, myofascial (2018), Nonintractable generalized idiopathic epilepsy without status epilepticus (HCC) (2018), and Recurrent major depressive disorder, in remission (HCC) (3/13/2019).    SURGICAL HISTORY  patient denies any surgical history    SOCIAL HISTORY  Social History     Tobacco Use   • Smoking status: Former Smoker     Years: 4.00     Types: Cigarettes     Last attempt to quit: 2017     Years since quittin.1   • Smokeless tobacco: Never Used   • Tobacco comment: Former Hookah   Substance Use Topics   • Alcohol use: No     Comment: Rarely   • Drug use: No      Social History     Substance and Sexual Activity   Drug Use No       FAMILY HISTORY  Family History   Problem Relation Age of Onset   • Lung Disease Mother         COPD in a smoker   • Other Mother         epilepsy (late 30s), migraines    • Arthritis Mother    • No Known Problems Sister        CURRENT MEDICATIONS  Home Medications     Reviewed by Wilfrid Mcbride R.N. (Registered Nurse) on 19 at   Med List Status: Partial   Medication Last Dose Status   asa/apap/caffeine (EXCEDRIN) 250-250-65 MG Tab  Active   diazePAM (VALIUM) 5 MG Tab  Active   diclofenac EC (VOLTAREN) 75 MG Tablet Delayed Response  Active   Diclofenac Sodium 1 % Gel  Active   Erenumab (AIMOVIG) 70 MG/ML Solution Auto-injector  Active   gabapentin (NEURONTIN) 300 MG Cap  Active   Magnesium 500 MG Tab  Active   omeprazole (PRILOSEC) 20 MG delayed-release capsule  Active                ALLERGIES  Allergies   Allergen Reactions   • Acetaminophen    • Amitriptyline      Abnormal movements   • Other Drug      medrisil       PHYSICAL EXAM  VITAL SIGNS: /73   Pulse 72   Temp 36.4 °C (97.5 °F) (Temporal)   Resp 14   Ht 1.803 m (5' 11\")   Wt 60.1 kg (132 lb 7.9 oz)   SpO2 96%   BMI 18.48 kg/m²  "     Constitutional: Alert in no apparent distress. Appears drowsy and mildly uncomfortable.   HENT: No signs of trauma, Bilateral external ears normal, Nose normal.   Eyes: Pupils are equal and reactive, Conjunctiva normal, Non-icteric.   Neck: Normal range of motion, No tenderness, Supple, No stridor.   Lymphatic: No lymphadenopathy noted.   Cardiovascular: Regular rate and rhythm, no murmurs.   Thorax & Lungs: Normal breath sounds, No respiratory distress, No wheezing, No chest tenderness.   Abdomen: Bowel sounds normal, Soft, Tenderness to palpation of epigastric area and RUQ with maximal tenderness in RUQ. Involuntary guarding. No masses, No pulsatile masses. No peritoneal signs.  Skin: Warm, Dry, No erythema, No rash.   Back: No bony tenderness, No CVA tenderness.   Extremities: Intact distal pulses, No edema, No tenderness, No cyanosis.  Musculoskeletal: Good range of motion in all major joints. No tenderness to palpation or major deformities noted.   Neurologic: Alert , Normal motor function, Normal sensory function, No focal deficits noted.   Psychiatric: Affect flat, Judgment normal, Mood normal.     DIAGNOSTIC STUDIES / PROCEDURES    LABS  Labs Reviewed   CBC WITH DIFFERENTIAL - Abnormal; Notable for the following components:       Result Value    RBC 4.30 (*)     Hemoglobin 13.8 (*)     Hematocrit 41.8 (*)     MCHC 33.0 (*)     All other components within normal limits   COMP METABOLIC PANEL - Abnormal; Notable for the following components:    Sodium 148 (*)     Chloride 113 (*)     All other components within normal limits   LIPASE - Abnormal; Notable for the following components:    Lipase 7 (*)     All other components within normal limits   URINALYSIS,CULTURE IF INDICATED   ESTIMATED GFR     All labs reviewed by me.    EKG Interpretation:  Interpreted by me    12 Lead EKG interpreted by me to show:  Normal sinus rhythm  Rate 63  Axis: Normal  Intervals: Normal  Normal T waves  Normal ST  segments  Early repolarization pattern and anterior and lateral leads.  My impression of this EKG: Does not indicate ischemia or arrhythmia at this time.         COURSE & MEDICAL DECISION MAKING  Pertinent Labs & Imaging studies reviewed. (See chart for details)    This is a 34 y.o. male that presents with epigastric abdominal pain.  This could represent pancreatitis versus hepatitis versus cholecystitis.  I will obtain the below labs and give the patient a GI cocktail and reassess..     8:32 PM - Patient seen and examined at bedside. Ordered CBC with diff, CMP, lipase, and urinalysis culture.  Patient will be medicated with GI cocktail 30 mL for his symptoms.     9:46 PM - Patient was reevaluated at bedside. He reports feeling slightly improved. Discussed lab and radiology results with the patient and informed them they were reassuring. I outlined my plan to discharge them at this time and the patient was understanding and agreeable to discharge.     The patient will return for new or worsening symptoms and is stable at the time of discharge.    The patient is referred to a primary physician for blood pressure management, diabetic screening, and for all other preventative health concerns.    Patient is feeling improved.  The patient has no significant anemia or leukocytosis.  Patient does have a slightly elevated sodium of 148.  He may be slightly dehydrated.  His lipase is normal.  His EKG shows no evidence of ischemia.  At this time I will discharge him home with strict return precautions and follow-up and Pepcid.  On follow-up with his primary care physician.    DISPOSITION:  Patient will be discharged home in stable condition.    FOLLOW UP:  Adelina Pearce M.D.  [address]  [address]  [facility] [id number] [phone]    In 2 days        OUTPATIENT MEDICATIONS:  Discharge Medication List as of 8/6/2019 10:04 PM      START taking these medications    Details   famotidine (PEPCID) 20 MG Tab Take 1 Tab by  mouth 2 times a day., Disp-60 Tab, R-0, Normal             FINAL IMPRESSION  1. Abdominal pain, unspecified abdominal location          I, Bimal Engle (Scribe), am scribing for, and in the presence of, Dain Bruce M.D..    Electronically signed by: Bimal Engle (Scribe), 8/6/2019    IDain M.D. personally performed the services described in this documentation, as scribed by Bimal Engle in my presence, and it is both accurate and complete.     C.    The note accurately reflects work and decisions made by me.  Dain Bruce  8/6/2019  11:56 PM

## 2019-08-07 NOTE — ED TRIAGE NOTES
"Sidney Culver  34 y.o. male  Chief Complaint   Patient presents with   • Abdominal Pain     x3-4 weeks, generalized upper abdominal pain, rated 7/10        Pt ambulated to triage with steady gait for above complaint.      Pt denies N/V/D, chills/fever. Pt denies worsening after eating.     Protocol ordered.     Pt back to lobby. Educated to inform staff of any concerns or changes.     Blood Pressure: 101/73, Pulse: 72, Respiration: 14, Temperature: 36.4 °C (97.5 °F), Height: 180.3 cm (5' 11\"), Weight: 60.1 kg (132 lb 7.9 oz), Pulse Oximetry: 96 %    "

## 2019-08-30 ENCOUNTER — OFFICE VISIT (OUTPATIENT)
Dept: MEDICAL GROUP | Facility: MEDICAL CENTER | Age: 34
End: 2019-08-30
Payer: COMMERCIAL

## 2019-08-30 VITALS
SYSTOLIC BLOOD PRESSURE: 100 MMHG | DIASTOLIC BLOOD PRESSURE: 62 MMHG | HEART RATE: 86 BPM | BODY MASS INDEX: 18.29 KG/M2 | WEIGHT: 138 LBS | TEMPERATURE: 98.2 F | RESPIRATION RATE: 14 BRPM | HEIGHT: 73 IN | OXYGEN SATURATION: 98 %

## 2019-08-30 DIAGNOSIS — G44.221 CHRONIC TENSION-TYPE HEADACHE, INTRACTABLE: ICD-10-CM

## 2019-08-30 DIAGNOSIS — G43.109 MIGRAINE WITH AURA AND WITHOUT STATUS MIGRAINOSUS, NOT INTRACTABLE: ICD-10-CM

## 2019-08-30 DIAGNOSIS — M79.18 MUSCLE PAIN, MYOFASCIAL: ICD-10-CM

## 2019-08-30 PROBLEM — G44.229 CHRONIC TENSION HEADACHE: Status: ACTIVE | Noted: 2019-08-30

## 2019-08-30 PROCEDURE — 99214 OFFICE O/P EST MOD 30 MIN: CPT | Performed by: FAMILY MEDICINE

## 2019-08-30 RX ORDER — DIAZEPAM 2 MG/1
2 TABLET ORAL 2 TIMES DAILY
Qty: 60 TAB | Refills: 0 | Status: SHIPPED | OUTPATIENT
Start: 2019-09-03 | End: 2019-09-27 | Stop reason: SDUPTHER

## 2019-08-30 RX ORDER — SUMATRIPTAN 100 MG/1
100 TABLET, FILM COATED ORAL
Qty: 10 TAB | Refills: 3 | Status: SHIPPED | OUTPATIENT
Start: 2019-08-30 | End: 2020-10-13 | Stop reason: SDUPTHER

## 2019-08-30 NOTE — ASSESSMENT & PLAN NOTE
This is a chronic health problem that is uncontrolled with current medications and lifestyle measures. Pt still doesn't have a way to end a migraine when they occur.  He was tried on Aimovig but his co-pay is too high and he cannot afford it.  Were going to put him back on Imitrex 100 mg so that he has that if he gets a migraine.  We will hope that that will help to extinguish that for him.

## 2019-08-30 NOTE — PROGRESS NOTES
CC:Diagnoses of Chronic tension-type headache, intractable, Migraine with aura and without status migrainosus, not intractable, and Muscle pain, myofascial were pertinent to this visit.    HISTORY OF PRESENT ILLNESS: Patient is a 34 y.o. male established patient who presents today to talk about his chronic health problems.  This patient unfortunately has not been able to get consistent follow-through at the neurologist office because it takes a long for testing to be approved by his insurance.  He needs new paperwork completed to extend his short-term disability through the end of September when he can finally be seen again after imaging that will be done in September.    Health Maintenance: Completed    Chronic tension headache  This is a chronic health problem for this patient that seems to go along with the migraine headaches.  The unfortunate part is most the time now he has a headache that is across the top of his head that will stay at a 2-3 level throughout the day.  If he is getting a migraine that typically is more localized and has a much higher intensity.  At this point he still is not appropriate to return to work because he is living with chronic pain.  He is gone off of all the over-the-counter anti-inflammatories and is still using his Voltaren 75 mg daily.  I am going to increase that dose to 100 mg to see if we can get a little bit better penetration in this chronic headache.  Patient will be having some CTs and MRIs done and then a follow-up with Dr. Wu his neurologist.  That cannot all get done until about the end of September.  Are going to need to extend his leave.    Migraine with aura and without status migrainosus, not intractable  This is a chronic health problem that is uncontrolled with current medications and lifestyle measures. Pt still doesn't have a way to end a migraine when they occur.  He was tried on Aimovig but his co-pay is too high and he cannot afford it.  Were going to put  him back on Imitrex 100 mg so that he has that if he gets a migraine.  We will hope that that will help to extinguish that for him.    Muscle pain, myofascial  This is a chronic problem for this patient where he has seen improvement in the muscle twitches and muscle pain since we started him on diazepam 5 mg twice a day.  The problem is the side effects of the fatigue and other days the insomnia.  Patient finds that when he is fatigued he will sleep 10-12 even 15 hours and then other times he does find that he cannot sleep at all and is up for 24 hours.  I would like to try lowering the dose since we have been able to get the twitching under control.  We will go down to diazepam 2 mg twice a day see him back in 4 weeks and then determine if that dose may be adequate without as much side effect.  Obtained and reviewed patient utilization report from [facility] pharmacy database on 8/30/2019 10:57 AM  prior to writing prescription for controlled substance II, III or IV per [place] bill . Based on assessment of the report,my physical exam if necessary and the patient's health problem, the prescription is medically necessary.         Patient Active Problem List    Diagnosis Date Noted   • Chronic tension headache 08/30/2019   • Acute midline thoracic back pain 08/05/2019   • Recurrent major depressive disorder, in remission (HCC) 03/13/2019   • Muscle pain, myofascial 11/06/2018   • Migraine with aura and without status migrainosus, not intractable 04/02/2018   • H/O juvenile rheumatoid arthritis 04/02/2018   • Nonintractable generalized idiopathic epilepsy without status epilepticus (HCC) 04/02/2018      Allergies:Acetaminophen; Amitriptyline; and Other drug    Current Outpatient Medications   Medication Sig Dispense Refill   • [START ON 9/3/2019] diazePAM (VALIUM) 2 MG Tab Take 1 Tab by mouth 2 Times a Day for 30 days. 60 Tab 0   • sumatriptan (IMITREX) 100 MG tablet Take 1 Tab by mouth Once PRN for Migraine for  up to 1 dose. 10 Tab 3   • famotidine (PEPCID) 20 MG Tab Take 1 Tab by mouth 2 times a day. 60 Tab 0   • gabapentin (NEURONTIN) 300 MG Cap Take 300 mg by mouth 3 times a day.  11   • Erenumab (AIMOVIG) 70 MG/ML Solution Auto-injector Inject 1 mL as instructed Q 4 Weeks. 1 PEN 4   • Magnesium 500 MG Tab Taking 1 daily and then 1 with the onset of a migraine. 100 Tab 3   • asa/apap/caffeine (EXCEDRIN) 250-250-65 MG Tab Take 1 Tab by mouth every 6 hours as needed for Headache.     • omeprazole (PRILOSEC) 20 MG delayed-release capsule Take 1 Cap by mouth every day. 90 Cap 3   • Diclofenac Sodium 1 % Gel Apply to painful muscles up to qid 200 g 11   • diclofenac EC (VOLTAREN) 75 MG Tablet Delayed Response TAKE 1 TABLET BY MOUTH TWICE DAILY 180 Tab 3     No current facility-administered medications for this visit.        Social History     Tobacco Use   • Smoking status: Former Smoker     Years: 4.00     Types: Cigarettes     Last attempt to quit: 2017     Years since quittin.2   • Smokeless tobacco: Never Used   • Tobacco comment: Former Hookah   Substance Use Topics   • Alcohol use: No     Comment: Rarely   • Drug use: No     Social History     Social History Narrative   • Not on file       Family History   Problem Relation Age of Onset   • Lung Disease Mother         COPD in a smoker   • Other Mother         epilepsy (late 30s), migraines    • Arthritis Mother    • No Known Problems Sister         ROS:     - Constitutional: Patient complaining of fatigue secondary to the diazepam.     - HEENT:  Negative for headaches, vision changes, hearing changes, ear pain, ear discharge, rhinorrhea, sinus congestion, sore throat, and neck pain.      - Respiratory: Negative for cough, sputum production, chest congestion, dyspnea, wheezing, and crackles.      - Cardiovascular: Negative for chest pain, palpitations, orthopnea, and bilateral lower extremity edema.     - Gastrointestinal: Patient was having some heartburn but  "that seems to have gotten better since he started on a proton pump inhibitor.      - Genitourinary: Negative for dysuria, polyuria, hematuria, pyuria, urinary urgency, and urinary incontinence.     - Musculoskeletal: Negative for myalgias, back pain, and joint pain.     - Skin: Negative for rash, itching, cyanotic skin color change.     - Neurological: Negative for dizziness, tingling, tremors, focal sensory deficit, focal weakness and headaches.     - Endo/Heme/Allergies: Does not bruise/bleed easily.     - Psychiatric/Behavioral: Negative for depression, suicidal/homicidal ideation and memory loss.          - NOTE: All other systems reviewed and are negative, except as in HPI.      Exam:    /62 (BP Location: Left arm, Patient Position: Sitting, BP Cuff Size: Adult)   Pulse 86   Temp 36.8 °C (98.2 °F) (Temporal)   Resp 14   Ht 1.854 m (6' 1\")   Wt 62.6 kg (138 lb)   SpO2 98%  Body mass index is 18.21 kg/m².    General:  Well nourished, well developed male in NAD  Head is grossly normal.    Patient was seen for 25 minutes face to face of which, 20 minutes was spent counseling regarding recent ER visit and discussion of current symptomatology and plans going forward with neurology..    Please note that this dictation was created using voice recognition software. I have made every reasonable attempt to correct obvious errors, but I expect that there are errors of grammar and possibly content that I did not discover before finalizing the note.    Assessment/Plan:  1. Chronic tension-type headache, intractable  Uncontrolled, we are going to try increasing his diclofenac up from 75 mg daily to 100 mg daily see if this provides any relief with the tension type headache.    2. Migraine with aura and without status migrainosus, not intractable  Uncontrolled, patient will go back on Imitrex taking that as needed severe headache.    3. Muscle pain, myofascial  Patient will go back on diazepam but were lowering the " dose to 2 mg twice a day see if it will still keep his muscle spasms under control but not make him so sleepy.  - diazePAM (VALIUM) 2 MG Tab; Take 1 Tab by mouth 2 Times a Day for 30 days.  Dispense: 60 Tab; Refill: 0

## 2019-08-30 NOTE — ASSESSMENT & PLAN NOTE
This is a chronic health problem for this patient that seems to go along with the migraine headaches.  The unfortunate part is most the time now he has a headache that is across the top of his head that will stay at a 2-3 level throughout the day.  If he is getting a migraine that typically is more localized and has a much higher intensity.  At this point he still is not appropriate to return to work because he is living with chronic pain.  He is gone off of all the over-the-counter anti-inflammatories and is still using his Voltaren 75 mg daily.  I am going to increase that dose to 100 mg to see if we can get a little bit better penetration in this chronic headache.  Patient will be having some CTs and MRIs done and then a follow-up with Dr. Wu his neurologist.  That cannot all get done until about the end of September.  Are going to need to extend his leave.

## 2019-08-30 NOTE — ASSESSMENT & PLAN NOTE
This is a chronic problem for this patient where he has seen improvement in the muscle twitches and muscle pain since we started him on diazepam 5 mg twice a day.  The problem is the side effects of the fatigue and other days the insomnia.  Patient finds that when he is fatigued he will sleep 10-12 even 15 hours and then other times he does find that he cannot sleep at all and is up for 24 hours.  I would like to try lowering the dose since we have been able to get the twitching under control.  We will go down to diazepam 2 mg twice a day see him back in 4 weeks and then determine if that dose may be adequate without as much side effect.  Obtained and reviewed patient utilization report from [facility] pharmacy database on 8/30/2019 10:57 AM  prior to writing prescription for controlled substance II, III or IV per [place] bill . Based on assessment of the report,my physical exam if necessary and the patient's health problem, the prescription is medically necessary.

## 2019-09-17 ENCOUNTER — HOSPITAL ENCOUNTER (OUTPATIENT)
Dept: RADIOLOGY | Facility: MEDICAL CENTER | Age: 34
End: 2019-09-17
Attending: SPECIALIST
Payer: COMMERCIAL

## 2019-09-17 DIAGNOSIS — R10.13 ABDOMINAL PAIN, EPIGASTRIC: ICD-10-CM

## 2019-09-17 DIAGNOSIS — M54.5 LOW BACK PAIN, UNSPECIFIED BACK PAIN LATERALITY, UNSPECIFIED CHRONICITY, WITH SCIATICA PRESENCE UNSPECIFIED: ICD-10-CM

## 2019-09-17 PROCEDURE — 74177 CT ABD & PELVIS W/CONTRAST: CPT

## 2019-09-17 PROCEDURE — 72146 MRI CHEST SPINE W/O DYE: CPT

## 2019-09-17 PROCEDURE — 700117 HCHG RX CONTRAST REV CODE 255: Performed by: SPECIALIST

## 2019-09-17 RX ADMIN — IOHEXOL 25 ML: 240 INJECTION, SOLUTION INTRATHECAL; INTRAVASCULAR; INTRAVENOUS; ORAL at 12:50

## 2019-09-17 RX ADMIN — IOHEXOL 100 ML: 350 INJECTION, SOLUTION INTRAVENOUS at 13:10

## 2019-09-27 ENCOUNTER — OFFICE VISIT (OUTPATIENT)
Dept: MEDICAL GROUP | Facility: MEDICAL CENTER | Age: 34
End: 2019-09-27
Payer: COMMERCIAL

## 2019-09-27 VITALS
WEIGHT: 140.87 LBS | BODY MASS INDEX: 18.67 KG/M2 | DIASTOLIC BLOOD PRESSURE: 64 MMHG | HEART RATE: 57 BPM | HEIGHT: 73 IN | RESPIRATION RATE: 14 BRPM | OXYGEN SATURATION: 97 % | SYSTOLIC BLOOD PRESSURE: 98 MMHG | TEMPERATURE: 97.3 F

## 2019-09-27 DIAGNOSIS — G43.109 MIGRAINE WITH AURA AND WITHOUT STATUS MIGRAINOSUS, NOT INTRACTABLE: ICD-10-CM

## 2019-09-27 DIAGNOSIS — G89.29 CHRONIC MIDLINE THORACIC BACK PAIN: ICD-10-CM

## 2019-09-27 DIAGNOSIS — G44.221 CHRONIC TENSION-TYPE HEADACHE, INTRACTABLE: ICD-10-CM

## 2019-09-27 DIAGNOSIS — Z23 NEED FOR VACCINATION: ICD-10-CM

## 2019-09-27 DIAGNOSIS — M54.6 CHRONIC MIDLINE THORACIC BACK PAIN: ICD-10-CM

## 2019-09-27 DIAGNOSIS — M79.18 MUSCLE PAIN, MYOFASCIAL: ICD-10-CM

## 2019-09-27 PROCEDURE — 90686 IIV4 VACC NO PRSV 0.5 ML IM: CPT | Performed by: FAMILY MEDICINE

## 2019-09-27 PROCEDURE — 99214 OFFICE O/P EST MOD 30 MIN: CPT | Mod: 25 | Performed by: FAMILY MEDICINE

## 2019-09-27 PROCEDURE — 90471 IMMUNIZATION ADMIN: CPT | Performed by: FAMILY MEDICINE

## 2019-09-27 RX ORDER — DIAZEPAM 2 MG/1
2 TABLET ORAL 2 TIMES DAILY
Qty: 60 TAB | Refills: 2 | Status: SHIPPED | OUTPATIENT
Start: 2019-10-03 | End: 2019-12-23 | Stop reason: SDUPTHER

## 2019-09-27 RX ORDER — GABAPENTIN 300 MG/1
600 CAPSULE ORAL 3 TIMES DAILY
Qty: 540 CAP | Refills: 3
Start: 2019-09-27 | End: 2019-12-26

## 2019-09-27 NOTE — ASSESSMENT & PLAN NOTE
This is a chronic health problem now for this patient where he continues to get muscle spasms in the thoracic spine.  This is another reason that were using the diazepam 2 mg twice a day.  He will still get muscle spasms in that area but they are not as severe as they were prior to starting the muscle relaxant with the benzodiazepine.

## 2019-09-27 NOTE — ASSESSMENT & PLAN NOTE
This is a chronic health problem for this patient where he still gets migraines but may be only once a week and the Imitrex does bring them down to his baseline headache level of 3-4/10.  Unfortunately they do not extinguish the headache and he still continues to get the migraines.  They are working on increasing his gabapentin which may help with this as well.

## 2019-09-27 NOTE — ASSESSMENT & PLAN NOTE
This is a chronic health problem where he has a constant headache that runs at a level of 3-4/10 all the time.  He sees Dr. Wu who has recommended he be referred to [facility] and he is starting that referral process.

## 2019-09-27 NOTE — PROGRESS NOTES
CC:Diagnoses of Chronic tension-type headache, intractable, Migraine with aura and without status migrainosus, not intractable, Muscle pain, myofascial, Chronic midline thoracic back pain, and Need for vaccination were pertinent to this visit.    HISTORY OF PRESENT ILLNESS: Patient is a 34 y.o. male established patient who presents today to talk about his chronic health issues and his chronic pain issues.  This patient continues to suffer with fairly significant tension headaches but now his neurologist wants him to go to [facility] and has initiated that referral process.  He is still having the migraine headaches which are completely debilitating along with the myofascial muscle pain and thoracic back pain causing spasms.  Patient is now having to utilize diazepam 2 mg twice daily on a daily basis to be able to be functional.  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      Chronic tension headache  This is a chronic health problem where he has a constant headache that runs at a level of 3-4/10 all the time.  He sees Dr. Wu who has recommended he be referred to [facility] and he is starting that referral process.    Migraine with aura and without status migrainosus, not intractable  This is a chronic health problem for this patient where he still gets migraines but may be only once a week and the Imitrex does bring them down to his baseline headache level of 3-4/10.  Unfortunately they do not extinguish the headache and he still continues to get the migraines.  They are working on increasing his gabapentin which may help with this as well.    Muscle pain, myofascial  This is a chronic health problem for this patient where he continues to get the muscle spasms along with some tremor.  At his last visit we had lowered his diazepam to 2 mg twice a day.  He reports that with that he had an uptake in the amount of spasm and tremor that he experiences.  He  states that it is livable but not workable.  He will have multiple episodes in a 24-hour period which makes it impossible for him to do the type of work he has to do.  Also it would not be safe for him to be on diazepam and working around machinery.  Obtained and reviewed patient utilization report from [facility] pharmacy database on 9/27/2019 9:17 AM  prior to writing prescription for controlled substance II, III or IV per [place] bill . Based on assessment of the report,my physical exam if necessary and the patient's health problem, the prescription is medically necessary.       Chronic midline thoracic back pain  This is a chronic health problem now for this patient where he continues to get muscle spasms in the thoracic spine.  This is another reason that were using the diazepam 2 mg twice a day.  He will still get muscle spasms in that area but they are not as severe as they were prior to starting the muscle relaxant with the benzodiazepine.      Patient Active Problem List    Diagnosis Date Noted   • Chronic tension headache 08/30/2019   • Chronic midline thoracic back pain 08/05/2019   • Recurrent major depressive disorder, in remission (HCC) 03/13/2019   • Muscle pain, myofascial 11/06/2018   • Migraine with aura and without status migrainosus, not intractable 04/02/2018   • H/O juvenile rheumatoid arthritis 04/02/2018   • Nonintractable generalized idiopathic epilepsy without status epilepticus (HCC) 04/02/2018      Allergies:Acetaminophen; Amitriptyline; and Other drug    Current Outpatient Medications   Medication Sig Dispense Refill   • gabapentin (NEURONTIN) 300 MG Cap Take 2 Caps by mouth 3 times a day for 90 days. 540 Cap 3   • [START ON 10/3/2019] diazePAM (VALIUM) 2 MG Tab Take 1 Tab by mouth 2 Times a Day for 90 days. 60 Tab 2   • sumatriptan (IMITREX) 100 MG tablet Take 1 Tab by mouth Once PRN for Migraine for up to 1 dose. 10 Tab 3   • diclofenac CR (VOLTAREN-XR) 100 MG TABLET SR 24 HR  tablet Take 1 Tab by mouth every day. 90 Tab 3   • famotidine (PEPCID) 20 MG Tab Take 1 Tab by mouth 2 times a day. 60 Tab 0   • Magnesium 500 MG Tab Taking 1 daily and then 1 with the onset of a migraine. 100 Tab 3   • asa/apap/caffeine (EXCEDRIN) 250-250-65 MG Tab Take 1 Tab by mouth every 6 hours as needed for Headache.     • omeprazole (PRILOSEC) 20 MG delayed-release capsule Take 1 Cap by mouth every day. 90 Cap 3   • Diclofenac Sodium 1 % Gel Apply to painful muscles up to qid 200 g 11     No current facility-administered medications for this visit.        Social History     Tobacco Use   • Smoking status: Former Smoker     Years: 4.00     Types: Cigarettes     Last attempt to quit: 2017     Years since quittin.3   • Smokeless tobacco: Never Used   • Tobacco comment: Former Hookah   Substance Use Topics   • Alcohol use: No     Comment: Rarely   • Drug use: No     Social History     Social History Narrative   • Not on file       Family History   Problem Relation Age of Onset   • Lung Disease Mother         COPD in a smoker   • Other Mother         epilepsy (late 30s), migraines    • Arthritis Mother    • No Known Problems Sister         ROS:     - Constitutional:  Negative for fever, chills, unexpected weight change, and fatigue/generalized weakness.    - HEENT:  Negative for  vision changes, hearing changes, ear pain, ear discharge, rhinorrhea, sinus congestion, sore throat, and neck pain.      - Respiratory: Negative for cough, sputum production, chest congestion, dyspnea, wheezing, and crackles.      - Cardiovascular: Negative for chest pain, palpitations, orthopnea, and bilateral lower extremity edema.     - Gastrointestinal: Negative for heartburn, nausea, vomiting, abdominal pain, hematochezia, melena, diarrhea, constipation, and greasy/foul-smelling stools.     - Genitourinary: Negative for dysuria, polyuria, hematuria, pyuria, urinary urgency, and urinary incontinence.     - Musculoskeletal:  "Positive generalized muscle spasms, thoracic back pain and occasional tremors.      - Skin: Negative for rash, itching, cyanotic skin color change.     - Neurological: Positive for headaches and migraines along with thoracic muscle spasms and back spasm     - Endo/Heme/Allergies: Does not bruise/bleed easily.     - Psychiatric/Behavioral: Negative for depression, suicidal/homicidal ideation and memory loss.          - NOTE: All other systems reviewed and are negative, except as in HPI.      Exam:    BP (!) 98/64 (BP Location: Left arm, Patient Position: Sitting, BP Cuff Size: Adult)   Pulse (!) 57   Temp 36.3 °C (97.3 °F) (Temporal)   Resp 14   Ht 1.854 m (6' 1\")   Wt 63.9 kg (140 lb 14 oz)   SpO2 97%  Body mass index is 18.59 kg/m².    General: Thin male appears somewhat sedated and seems to be having a headache here in the office.    Patient was seen for 25 minutes face to face of which, 20 minutes was spent counseling regarding discussion of current situation with his headaches and referral to [place].  Discussion of the addictive nature of diazepam and my concerns for him having to continue to use that for muscle spasms.  Have asked the patient to try to use the lowest dose that is effective..    Please note that this dictation was created using voice recognition software. I have made every reasonable attempt to correct obvious errors, but I expect that there are errors of grammar and possibly content that I did not discover before finalizing the note.    Assessment/Plan:  1. Chronic tension-type headache, intractable  Uncontrolled, patient going to [place]    2. Migraine with aura and without status migrainosus, not intractable  Adequately controlled with Imitrex    3. Muscle pain, myofascial  Adequately controlled with diazepam 2 mg twice daily  - diazePAM (VALIUM) 2 MG Tab; Take 1 Tab by mouth 2 Times a Day for 90 days.  Dispense: 60 Tab; Refill: 2    4. Chronic midline thoracic back pain  Adequately " controlled with diazepam 2 mg twice daily.  Prescription written for the coming 90 days.    5. Need for vaccination  Given today  - Influenza Vaccine Quad Injection (PF)

## 2019-09-27 NOTE — ASSESSMENT & PLAN NOTE
This is a chronic health problem for this patient where he continues to get the muscle spasms along with some tremor.  At his last visit we had lowered his diazepam to 2 mg twice a day.  He reports that with that he had an uptake in the amount of spasm and tremor that he experiences.  He states that it is livable but not workable.  He will have multiple episodes in a 24-hour period which makes it impossible for him to do the type of work he has to do.  Also it would not be safe for him to be on diazepam and working around machinery.  Obtained and reviewed patient utilization report from [facility] pharmacy database on 9/27/2019 9:17 AM  prior to writing prescription for controlled substance II, III or IV per [place] bill . Based on assessment of the report,my physical exam if necessary and the patient's health problem, the prescription is medically necessary.

## 2019-10-02 ENCOUNTER — TELEPHONE (OUTPATIENT)
Dept: MEDICAL GROUP | Facility: MEDICAL CENTER | Age: 34
End: 2019-10-02

## 2019-10-02 NOTE — TELEPHONE ENCOUNTER
"· Disability paperwork received from [employer] requiring provider signature.     · All appropriate fields completed by Medical Assistant: Yes    · Paperwork placed in \"MA to Provider\" folder/basket. Awaiting provider completion/signature.    "

## 2019-10-18 ENCOUNTER — HOSPITAL ENCOUNTER (OUTPATIENT)
Dept: LAB | Facility: MEDICAL CENTER | Age: 34
End: 2019-10-18
Attending: SPECIALIST
Payer: COMMERCIAL

## 2019-10-18 ENCOUNTER — TELEPHONE (OUTPATIENT)
Dept: MEDICAL GROUP | Facility: MEDICAL CENTER | Age: 34
End: 2019-10-18

## 2019-10-18 LAB
ALBUMIN SERPL BCP-MCNC: 4.5 G/DL (ref 3.2–4.9)
ALBUMIN/GLOB SERPL: 1.4 G/DL
ALP SERPL-CCNC: 67 U/L (ref 30–99)
ALT SERPL-CCNC: 18 U/L (ref 2–50)
ANION GAP SERPL CALC-SCNC: 6 MMOL/L (ref 0–11.9)
APTT PPP: 39.6 SEC (ref 24.7–36)
AST SERPL-CCNC: 19 U/L (ref 12–45)
BASOPHILS # BLD AUTO: 1.3 % (ref 0–1.8)
BASOPHILS # BLD: 0.08 K/UL (ref 0–0.12)
BILIRUB SERPL-MCNC: 0.4 MG/DL (ref 0.1–1.5)
BUN SERPL-MCNC: 13 MG/DL (ref 8–22)
CALCIUM SERPL-MCNC: 9.3 MG/DL (ref 8.5–10.5)
CHLORIDE SERPL-SCNC: 104 MMOL/L (ref 96–112)
CO2 SERPL-SCNC: 28 MMOL/L (ref 20–33)
CREAT SERPL-MCNC: 1.04 MG/DL (ref 0.5–1.4)
EOSINOPHIL # BLD AUTO: 0.11 K/UL (ref 0–0.51)
EOSINOPHIL NFR BLD: 1.8 % (ref 0–6.9)
ERYTHROCYTE [DISTWIDTH] IN BLOOD BY AUTOMATED COUNT: 39.8 FL (ref 35.9–50)
ERYTHROCYTE [SEDIMENTATION RATE] IN BLOOD BY WESTERGREN METHOD: 24 MM/HOUR (ref 0–15)
GLOBULIN SER CALC-MCNC: 3.3 G/DL (ref 1.9–3.5)
GLUCOSE SERPL-MCNC: 93 MG/DL (ref 65–99)
HCT VFR BLD AUTO: 46.8 % (ref 42–52)
HGB BLD-MCNC: 14.8 G/DL (ref 14–18)
IMM GRANULOCYTES # BLD AUTO: 0.01 K/UL (ref 0–0.11)
IMM GRANULOCYTES NFR BLD AUTO: 0.2 % (ref 0–0.9)
INR PPP: 1.01 (ref 0.87–1.13)
LYMPHOCYTES # BLD AUTO: 1.7 K/UL (ref 1–4.8)
LYMPHOCYTES NFR BLD: 28 % (ref 22–41)
MCH RBC QN AUTO: 29.8 PG (ref 27–33)
MCHC RBC AUTO-ENTMCNC: 31.6 G/DL (ref 33.7–35.3)
MCV RBC AUTO: 94.4 FL (ref 81.4–97.8)
MONOCYTES # BLD AUTO: 0.56 K/UL (ref 0–0.85)
MONOCYTES NFR BLD AUTO: 9.2 % (ref 0–13.4)
NEUTROPHILS # BLD AUTO: 3.62 K/UL (ref 1.82–7.42)
NEUTROPHILS NFR BLD: 59.5 % (ref 44–72)
NRBC # BLD AUTO: 0 K/UL
NRBC BLD-RTO: 0 /100 WBC
PLATELET # BLD AUTO: 306 K/UL (ref 164–446)
PMV BLD AUTO: 10.6 FL (ref 9–12.9)
POTASSIUM SERPL-SCNC: 4.3 MMOL/L (ref 3.6–5.5)
PROT SERPL-MCNC: 7.8 G/DL (ref 6–8.2)
PROTHROMBIN TIME: 13.5 SEC (ref 12–14.6)
RBC # BLD AUTO: 4.96 M/UL (ref 4.7–6.1)
RHEUMATOID FACT SER IA-ACNC: <10 IU/ML (ref 0–14)
SODIUM SERPL-SCNC: 138 MMOL/L (ref 135–145)
WBC # BLD AUTO: 6.1 K/UL (ref 4.8–10.8)

## 2019-10-18 PROCEDURE — 83519 RIA NONANTIBODY: CPT | Mod: 91

## 2019-10-18 PROCEDURE — 86341 ISLET CELL ANTIBODY: CPT

## 2019-10-18 PROCEDURE — 85730 THROMBOPLASTIN TIME PARTIAL: CPT

## 2019-10-18 PROCEDURE — 85652 RBC SED RATE AUTOMATED: CPT

## 2019-10-18 PROCEDURE — 86038 ANTINUCLEAR ANTIBODIES: CPT

## 2019-10-18 PROCEDURE — 85610 PROTHROMBIN TIME: CPT

## 2019-10-18 PROCEDURE — 86431 RHEUMATOID FACTOR QUANT: CPT

## 2019-10-18 PROCEDURE — 36415 COLL VENOUS BLD VENIPUNCTURE: CPT

## 2019-10-18 PROCEDURE — 85025 COMPLETE CBC W/AUTO DIFF WBC: CPT

## 2019-10-18 PROCEDURE — 86255 FLUORESCENT ANTIBODY SCREEN: CPT | Mod: 91

## 2019-10-18 PROCEDURE — 80053 COMPREHEN METABOLIC PANEL: CPT

## 2019-10-18 PROCEDURE — 86039 ANTINUCLEAR ANTIBODIES (ANA): CPT

## 2019-10-18 NOTE — TELEPHONE ENCOUNTER
Phone Number Called: [phone] (home)      Call outcome: left message for patient to call back regarding message below    Message: I have left message for patient letting him know I have faxed over his paperwork and he can come by the office and pick it up if he would like. Paperwork has been scanned in also.

## 2019-10-20 LAB — NUCLEAR IGG SER QL IA: DETECTED

## 2019-10-23 LAB
ANA INTERPRETIVE COMMENT Q5143: ABNORMAL
ANA PATTERN Q5144: ABNORMAL
ANA TITER Q5145: ABNORMAL
ANTINUCLEAR ANTIBODY (ANA), HEP-2, IGG Q5142: DETECTED

## 2019-11-07 LAB — TEST NAME 95000: NORMAL

## 2019-12-23 ENCOUNTER — OFFICE VISIT (OUTPATIENT)
Dept: MEDICAL GROUP | Facility: MEDICAL CENTER | Age: 34
End: 2019-12-23
Payer: COMMERCIAL

## 2019-12-23 VITALS
WEIGHT: 143.3 LBS | BODY MASS INDEX: 18.99 KG/M2 | HEART RATE: 81 BPM | SYSTOLIC BLOOD PRESSURE: 116 MMHG | RESPIRATION RATE: 12 BRPM | OXYGEN SATURATION: 98 % | DIASTOLIC BLOOD PRESSURE: 86 MMHG | HEIGHT: 73 IN | TEMPERATURE: 98.1 F

## 2019-12-23 DIAGNOSIS — F33.40 RECURRENT MAJOR DEPRESSIVE DISORDER, IN REMISSION (HCC): ICD-10-CM

## 2019-12-23 DIAGNOSIS — M79.18 MUSCLE PAIN, MYOFASCIAL: ICD-10-CM

## 2019-12-23 DIAGNOSIS — G89.29 CHRONIC MIDLINE THORACIC BACK PAIN: ICD-10-CM

## 2019-12-23 DIAGNOSIS — G43.109 MIGRAINE WITH AURA AND WITHOUT STATUS MIGRAINOSUS, NOT INTRACTABLE: ICD-10-CM

## 2019-12-23 DIAGNOSIS — M54.6 CHRONIC MIDLINE THORACIC BACK PAIN: ICD-10-CM

## 2019-12-23 DIAGNOSIS — G40.309 NONINTRACTABLE GENERALIZED IDIOPATHIC EPILEPSY WITHOUT STATUS EPILEPTICUS (HCC): ICD-10-CM

## 2019-12-23 PROCEDURE — 99214 OFFICE O/P EST MOD 30 MIN: CPT | Performed by: FAMILY MEDICINE

## 2019-12-23 RX ORDER — DULOXETIN HYDROCHLORIDE 30 MG/1
30 CAPSULE, DELAYED RELEASE ORAL DAILY
Qty: 90 CAP | Refills: 3 | Status: SHIPPED | OUTPATIENT
Start: 2019-12-23 | End: 2021-01-08 | Stop reason: SDUPTHER

## 2019-12-23 RX ORDER — FAMOTIDINE 20 MG/1
20 TABLET, FILM COATED ORAL 2 TIMES DAILY
Qty: 180 TAB | Refills: 3 | Status: SHIPPED | OUTPATIENT
Start: 2019-12-23 | End: 2021-01-20 | Stop reason: SDUPTHER

## 2019-12-23 RX ORDER — DIAZEPAM 2 MG/1
2 TABLET ORAL 2 TIMES DAILY
Qty: 60 TAB | Refills: 2 | Status: SHIPPED | OUTPATIENT
Start: 2019-12-23 | End: 2020-02-03 | Stop reason: SDUPTHER

## 2019-12-23 NOTE — ASSESSMENT & PLAN NOTE
This is a chronic issue for this patient that with the Valium most of his muscle twitches are not extremely painful as they had been previously.  He is able to manage his own activities of daily living.

## 2019-12-23 NOTE — ASSESSMENT & PLAN NOTE
This is a chronic health problem for this patient the most likely is related to gastroesophageal reflux disease.  When he does not take his Pepcid he will have the midline thoracic back pain which is probably stomach pain radiating to the back.  When he takes the Pepcid he usually does fairly well.  We will renew his prescription and have him continue with medication long-term.

## 2019-12-23 NOTE — PROGRESS NOTES
CC:Diagnoses of Nonintractable generalized idiopathic epilepsy without status epilepticus (HCC), Muscle pain, myofascial, Migraine with aura and without status migrainosus, not intractable, Chronic midline thoracic back pain, and Recurrent major depressive disorder, in remission (HCC) were pertinent to this visit.    HISTORY OF PRESENT ILLNESS: Patient is a 34 y.o. male established patient who presents today to follow-up on his chronic health problems as outlined below.  The patient informs me that he is now been moved from short-term disability to long-term disability about.  I suspect at some point this gentleman is going to have to be retrained to a different job.  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      Nonintractable generalized idiopathic epilepsy without status epilepticus (HCC)  Patient presents today telling me that he is now on long-term disability as of November 4.  He continues to require Valium to keep his muscle spasms under control.  When he first gets up in the morning after being asleep for 8 hours and not having the Valium in his system he will have major twitches and then after the Valium takes effect he will have some minor twitches in his hands etc.  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  He states that his long-term disability will consider re-training him with something else in the future in light of the medications he has to remain on.  I think that is very important.    Muscle pain, myofascial  This is a chronic issue for this patient that with the Valium most of his muscle twitches are not extremely painful as they had been previously.  He is able to manage his own activities of daily living.    Migraine with aura and without status migrainosus, not intractable  This is a chronic health problem that the patient  continues to follow with Dr. Zulema Wu of neurology.  His migraines have gotten better he does still have some fairly significant headaches that the Imitrex generic does not necessarily take care of.  We discussed that when that happens more than likely it was not a migraine just a severe headache.  He still is having 2-3 migraines per week which is more than you would like to see him have.    Chronic midline thoracic back pain  This is a chronic health problem for this patient the most likely is related to gastroesophageal reflux disease.  When he does not take his Pepcid he will have the midline thoracic back pain which is probably stomach pain radiating to the back.  When he takes the Pepcid he usually does fairly well.  We will renew his prescription and have him continue with medication long-term.    Recurrent major depressive disorder, in remission (HCC)  This is a chronic health problem for this patient for which she is not on medication at this time.  In talking with him he has absolutely no homicidal ideation but has had some suicidal ideation without a plan.  His current situation is that he is on long-term disability more than likely having to contemplate changing careers.  He also has had some interpersonal relationship issues with his significant other.  At this point I would like to try duloxetine 30 mg we could go up to 60 in 6 weeks to see if we can help with depression as well as pain since it carries that dual purpose.  Patient is willing to try that.  We will start with duloxetine 30 mg daily.  He can MyChart me if he is having any problems.      Patient Active Problem List    Diagnosis Date Noted   • Chronic tension headache 08/30/2019   • Chronic midline thoracic back pain 08/05/2019   • Recurrent major depressive disorder, in remission (HCC) 03/13/2019   • Muscle pain, myofascial 11/06/2018   • Migraine with aura and without status migrainosus, not intractable 04/02/2018   • H/O juvenile  rheumatoid arthritis 2018   • Nonintractable generalized idiopathic epilepsy without status epilepticus (HCC) 2018      Allergies:Acetaminophen; Amitriptyline; and Other drug    Current Outpatient Medications   Medication Sig Dispense Refill   • famotidine (PEPCID) 20 MG Tab Take 1 Tab by mouth 2 times a day. 180 Tab 3   • DULoxetine (CYMBALTA) 30 MG Cap DR Particles Take 1 Cap by mouth every day. 90 Cap 3   • gabapentin (NEURONTIN) 300 MG Cap Take 2 Caps by mouth 3 times a day for 90 days. 540 Cap 3   • diazePAM (VALIUM) 2 MG Tab Take 1 Tab by mouth 2 Times a Day for 90 days. 60 Tab 2   • sumatriptan (IMITREX) 100 MG tablet Take 1 Tab by mouth Once PRN for Migraine for up to 1 dose. 10 Tab 3   • Magnesium 500 MG Tab Taking 1 daily and then 1 with the onset of a migraine. 100 Tab 3   • diclofenac CR (VOLTAREN-XR) 100 MG TABLET SR 24 HR tablet Take 1 Tab by mouth every day. 90 Tab 3   • asa/apap/caffeine (EXCEDRIN) 250-250-65 MG Tab Take 1 Tab by mouth every 6 hours as needed for Headache.     • Diclofenac Sodium 1 % Gel Apply to painful muscles up to qid 200 g 11     No current facility-administered medications for this visit.        Social History     Tobacco Use   • Smoking status: Former Smoker     Packs/day: 0.00     Years: 4.00     Pack years: 0.00     Types: Cigarettes     Last attempt to quit: 2017     Years since quittin.5   • Smokeless tobacco: Never Used   • Tobacco comment: Former Hookah   Substance Use Topics   • Alcohol use: No     Comment: Rarely   • Drug use: No     Social History     Patient does not qualify to have social determinant information on file (likely too young).   Social History Narrative   • Not on file       Family History   Problem Relation Age of Onset   • Lung Disease Mother         COPD in a smoker   • Other Mother         epilepsy (late 30s), migraines    • Arthritis Mother    • No Known Problems Sister         ROS:     - Constitutional: Positive for mild  "fatigue and malaise and generalized weakness denies fever, chills, unexpected weight change..    - HEENT:  Negative for headaches, vision changes, hearing changes, ear pain, ear discharge, rhinorrhea, sinus congestion, sore throat, and neck pain.      - Respiratory: Negative for cough, sputum production, chest congestion, dyspnea, wheezing, and crackles.      - Cardiovascular: Negative for chest pain, palpitations, orthopnea, and bilateral lower extremity edema.     - Gastrointestinal: Negative for heartburn, nausea, vomiting, abdominal pain, hematochezia, melena, diarrhea, constipation, and greasy/foul-smelling stools.     - Genitourinary: Negative for dysuria, polyuria, hematuria, pyuria, urinary urgency, and urinary incontinence.     - Musculoskeletal: Positive for midthoracic back pain most likely GERD referred.      - Skin: Negative for rash, itching, cyanotic skin color change.     - Neurological: Positive for continued headaches and migraines.     - Endo/Heme/Allergies: Does not bruise/bleed easily.     - Psychiatric/Behavioral: Positive for depression without suicidal or homicidal ideation.           - NOTE: All other systems reviewed and are negative, except as in HPI.      Exam:    /86 (BP Location: Left arm, Patient Position: Sitting, BP Cuff Size: Adult)   Pulse 81   Temp 36.7 °C (98.1 °F) (Temporal)   Resp 12   Ht 1.854 m (6' 1\")   Wt 65 kg (143 lb 4.8 oz)   SpO2 98%  Body mass index is 18.91 kg/m².    General:  Well nourished, well developed male in NAD    Patient was seen for 25 minutes face to face of which, 20 minutes was spent counseling regarding medications interactions and side effects as well as plans going forward to get pain under better control as well as possibility of retraining with long-term disability..    Please note that this dictation was created using voice recognition software. I have made every reasonable attempt to correct obvious errors, but I expect that there are " errors of grammar and possibly content that I did not discover before finalizing the note.    Assessment/Plan:  1. Nonintractable generalized idiopathic epilepsy without status epilepticus (HCC)  Adequately controlled with current dose of Valium.  We will renew his Valium which is required to keep his epilepsy under control    2. Muscle pain, myofascial  Myofascial pain is doing well with Valium    3. Migraine with aura and without status migrainosus, not intractable  Working with Dr. Wu    4. Chronic midline thoracic back pain  Controlled as long as he takes his Pepcid    5. Recurrent major depressive disorder, in remission (HCC)  Uncontrolled, will get a try duloxetine 30 mg daily see if we can get his mood improved and also use the side effect that it helps control chronic pain.    Will fax to his long term disability insurance carrier:  [phone] attn:  Skylar

## 2019-12-23 NOTE — ASSESSMENT & PLAN NOTE
Patient presents today telling me that he is now on long-term disability as of November 4.  He continues to require Valium to keep his muscle spasms under control.  When he first gets up in the morning after being asleep for 8 hours and not having the Valium in his system he will have major twitches and then after the Valium takes effect he will have some minor twitches in his hands etc.  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  He states that his long-term disability will consider re-training him with something else in the future in light of the medications he has to remain on.  I think that is very important.

## 2019-12-23 NOTE — ASSESSMENT & PLAN NOTE
This is a chronic health problem for this patient for which she is not on medication at this time.  In talking with him he has absolutely no homicidal ideation but has had some suicidal ideation without a plan.  His current situation is that he is on long-term disability more than likely having to contemplate changing careers.  He also has had some interpersonal relationship issues with his significant other.  At this point I would like to try duloxetine 30 mg we could go up to 60 in 6 weeks to see if we can help with depression as well as pain since it carries that dual purpose.  Patient is willing to try that.  We will start with duloxetine 30 mg daily.  He can MyChart me if he is having any problems.

## 2019-12-23 NOTE — ASSESSMENT & PLAN NOTE
This is a chronic health problem that the patient continues to follow with Dr. Zulema Wu of neurology.  His migraines have gotten better he does still have some fairly significant headaches that the Imitrex generic does not necessarily take care of.  We discussed that when that happens more than likely it was not a migraine just a severe headache.  He still is having 2-3 migraines per week which is more than you would like to see him have.

## 2019-12-26 ENCOUNTER — TELEPHONE (OUTPATIENT)
Dept: MEDICAL GROUP | Facility: MEDICAL CENTER | Age: 34
End: 2019-12-26

## 2019-12-26 NOTE — TELEPHONE ENCOUNTER
"· FMLA paperwork received from [facility] requiring provider signature.     · All appropriate fields completed by Medical Assistant: Yes    · Paperwork placed in \"MA to Provider\" folder/basket. Awaiting provider completion/signature.      Printed out pervious one.   "

## 2020-02-03 ENCOUNTER — OFFICE VISIT (OUTPATIENT)
Dept: MEDICAL GROUP | Facility: MEDICAL CENTER | Age: 35
End: 2020-02-03
Payer: COMMERCIAL

## 2020-02-03 VITALS
OXYGEN SATURATION: 98 % | HEART RATE: 87 BPM | SYSTOLIC BLOOD PRESSURE: 102 MMHG | DIASTOLIC BLOOD PRESSURE: 56 MMHG | WEIGHT: 134.48 LBS | TEMPERATURE: 98.7 F | BODY MASS INDEX: 18.21 KG/M2 | RESPIRATION RATE: 12 BRPM | HEIGHT: 72 IN

## 2020-02-03 DIAGNOSIS — G40.309 NONINTRACTABLE GENERALIZED IDIOPATHIC EPILEPSY WITHOUT STATUS EPILEPTICUS (HCC): ICD-10-CM

## 2020-02-03 DIAGNOSIS — M79.18 MUSCLE PAIN, MYOFASCIAL: ICD-10-CM

## 2020-02-03 DIAGNOSIS — F33.40 RECURRENT MAJOR DEPRESSIVE DISORDER, IN REMISSION (HCC): ICD-10-CM

## 2020-02-03 PROCEDURE — 99214 OFFICE O/P EST MOD 30 MIN: CPT | Performed by: FAMILY MEDICINE

## 2020-02-03 RX ORDER — GABAPENTIN 300 MG/1
CAPSULE ORAL
COMMUNITY
Start: 2020-01-12

## 2020-02-03 RX ORDER — DIAZEPAM 2 MG/1
2 TABLET ORAL 2 TIMES DAILY
Qty: 60 TAB | Refills: 2 | Status: SHIPPED | OUTPATIENT
Start: 2020-02-03 | End: 2020-04-30 | Stop reason: SDUPTHER

## 2020-02-03 NOTE — ASSESSMENT & PLAN NOTE
This is a chronic health problem for this patient that continues to be a problem.  This patient is substantially weaker than he was when all of these problems initially started approximately 2 years ago.  This patient in my opinion at this time would not be able to go back to his previous position.  He is currently on long-term disability and they are then going to determine whether or not he can go back to his previous position or be retrained.

## 2020-02-03 NOTE — ASSESSMENT & PLAN NOTE
This is a chronic health problem that is well controlled with current medications and lifestyle measures.  Patient now is on duloxetine 30 mg daily.  He has seen improvement in his mood.  He initially had worsening pain but that seems to have improved as well.  He also was having problems with nausea from the medication but for about the last 5 to 7 days that has now subsided.  Overall he is doing better being on the duloxetine then he was being off of it.  I am going to leave him at just that 30 mg dose.  We are going to restart his diazepam which he utilizes for his epilepsy and muscle shaking.

## 2020-02-03 NOTE — PROGRESS NOTES
CC:Diagnoses of Recurrent major depressive disorder, in remission (HCC), Nonintractable generalized idiopathic epilepsy without status epilepticus (HCC), and Muscle pain, myofascial were pertinent to this visit.    HISTORY OF PRESENT ILLNESS: Patient is a 35 y.o. male established patient who presents today to follow-up on his use of duloxetine to try and help with depression and get refills of his diazepam.  We had tried to send a new prescription for diazepam for the patient but somehow that never got through and the patient did not get his new prescription.  He has been without the medication for 1 month.  He is noted increased muscle fasciculations as well as epileptic type muscle movements.  Patient's mood seems to be much better on the duloxetine but he did have the nausea for a fairly long time after starting the medication so I do not want increase his dose as of yet.  We will wait to we see him back.  Patient continues on long-term disability  Recurrent major depressive disorder, in remission (HCC)  This is a chronic health problem that is well controlled with current medications and lifestyle measures.  Patient now is on duloxetine 30 mg daily.  He has seen improvement in his mood.  He initially had worsening pain but that seems to have improved as well.  He also was having problems with nausea from the medication but for about the last 5 to 7 days that has now subsided.  Overall he is doing better being on the duloxetine then he was being off of it.  I am going to leave him at just that 30 mg dose.  We are going to restart his diazepam which he utilizes for his epilepsy and muscle shaking.    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  Patient takes this on a regular basis to keep his muscle fasciculations and epilepsy under control.  We will renew that today.  Obtained  and reviewed patient utilization report from [facility] pharmacy database on 2/3/2020 8:52 AM  prior to writing prescription for controlled substance II, III or IV per [place] bill . Based on assessment of the report,my physical exam if necessary and the patient's health problem, the prescription is medically necessary.       Muscle pain, myofascial  This is a chronic health problem for this patient that continues to be a problem.  This patient is substantially weaker than he was when all of these problems initially started approximately 2 years ago.  This patient in my opinion at this time would not be able to go back to his previous position.  He is currently on long-term disability and they are then going to determine whether or not he can go back to his previous position or be retrained.      Patient Active Problem List    Diagnosis Date Noted   • Chronic tension headache 08/30/2019   • Chronic midline thoracic back pain 08/05/2019   • Recurrent major depressive disorder, in remission (HCC) 03/13/2019   • Muscle pain, myofascial 11/06/2018   • Migraine with aura and without status migrainosus, not intractable 04/02/2018   • H/O juvenile rheumatoid arthritis 04/02/2018   • Nonintractable generalized idiopathic epilepsy without status epilepticus (HCC) 04/02/2018      Allergies:Acetaminophen; Amitriptyline; and Other drug    Current Outpatient Medications   Medication Sig Dispense Refill   • diazePAM (VALIUM) 2 MG Tab Take 1 Tab by mouth 2 Times a Day for 90 days. 60 Tab 2   • gabapentin (NEURONTIN) 300 MG Cap TK 2 CS PO TID     • famotidine (PEPCID) 20 MG Tab Take 1 Tab by mouth 2 times a day. 180 Tab 3   • DULoxetine (CYMBALTA) 30 MG Cap DR Particles Take 1 Cap by mouth every day. 90 Cap 3   • sumatriptan (IMITREX) 100 MG tablet Take 1 Tab by mouth Once PRN for Migraine for up to 1 dose. 10 Tab 3   • diclofenac CR (VOLTAREN-XR) 100 MG TABLET SR 24 HR tablet Take 1 Tab by mouth every day. 90 Tab 3   •  Magnesium 500 MG Tab Taking 1 daily and then 1 with the onset of a migraine. 100 Tab 3   • asa/apap/caffeine (EXCEDRIN) 250-250-65 MG Tab Take 1 Tab by mouth every 6 hours as needed for Headache.     • Diclofenac Sodium 1 % Gel Apply to painful muscles up to qid 200 g 11     No current facility-administered medications for this visit.        Social History     Tobacco Use   • Smoking status: Former Smoker     Packs/day: 0.00     Years: 4.00     Pack years: 0.00     Types: Cigarettes     Last attempt to quit: 2017     Years since quittin.6   • Smokeless tobacco: Never Used   • Tobacco comment: Former Hookah   Substance Use Topics   • Alcohol use: No     Comment: Rarely   • Drug use: No     Social History     Patient does not qualify to have social determinant information on file (likely too young).   Social History Narrative   • Not on file       Family History   Problem Relation Age of Onset   • Lung Disease Mother         COPD in a smoker   • Other Mother         epilepsy (late 30s), migraines    • Arthritis Mother    • No Known Problems Sister         ROS:     - Constitutional: Positive for generalized weakness and fatigue of a daily nature.    - HEENT:  Negative for headaches, vision changes, hearing changes, ear pain, ear discharge, rhinorrhea, sinus congestion, sore throat, and neck pain.      - Respiratory: Negative for cough, sputum production, chest congestion, dyspnea, wheezing, and crackles.      - Cardiovascular: Negative for chest pain, palpitations, orthopnea, and bilateral lower extremity edema.     - Gastrointestinal: Negative for heartburn, nausea, vomiting, abdominal pain, hematochezia, melena, diarrhea, constipation, and greasy/foul-smelling stools.     - Genitourinary: Negative for dysuria, polyuria, hematuria, pyuria, urinary urgency, and urinary incontinence.     - Musculoskeletal: Patient still gets midthoracic pain with repetitive movement and is generally weak.      - Skin: Negative  for rash, itching, cyanotic skin color change.     - Neurological: Positive for muscle fasciculations without diazepam.  Headaches continue to be chronic in nature.      - Endo/Heme/Allergies: Does not bruise/bleed easily.     - Psychiatric/Behavioral: Negative for depression, suicidal/homicidal ideation and memory loss.          - NOTE: All other systems reviewed and are negative, except as in HPI.      Exam:    /56   Pulse 87   Temp 37.1 °C (98.7 °F)   Resp 12   Ht 1.829 m (6')   Wt 61 kg (134 lb 7.7 oz)   SpO2 98%  Body mass index is 18.24 kg/m².    General: Thin almost cachectic appearing male in no apparent distress.  Does smile easily as I come into the room and is more engaging during the office visit.  Head is grossly normal.  Neck: Supple without JVD or bruit. Thyroid is not enlarged.  Pulmonary: Clear to ausculation and percussion.  Normal effort. No rales, ronchi, or wheezing.  Cardiovascular: Regular rate and rhythm without murmur. Carotid and radial pulses are intact and equal bilaterally.  Extremities: no clubbing, cyanosis, or edema.  Patient was seen for 25 minutes face to face of which, 20 minutes was spent counseling regarding medications interactions and side effects as well as a discussion of his depression and muscle fasciculations long-term..    Please note that this dictation was created using voice recognition software. I have made every reasonable attempt to correct obvious errors, but I expect that there are errors of grammar and possibly content that I did not discover before finalizing the note.    Assessment/Plan:  1. Recurrent major depressive disorder, in remission (HCC)  Improved with duloxetine 30 mg daily.  We will look at having him continue with that dose for now.  When I see him back in 3 months we will consider going up to 60 mg.    2. Nonintractable generalized idiopathic epilepsy without status epilepticus (HCC)  Quickly controlled with diazepam 2 mg twice  daily.    3. Muscle pain, myofascial  Uncontrolled, patient continues to show increasing weakness which makes me doubt that he would be able to go back to the job yet previously which required fairly heavy lifting on a repetitive basis.  - diazePAM (VALIUM) 2 MG Tab; Take 1 Tab by mouth 2 Times a Day for 90 days.  Dispense: 60 Tab; Refill: 2

## 2020-02-03 NOTE — ASSESSMENT & PLAN NOTE
This is a chronic health problem for this patient and time to renew his diazepam 2 mg twice a day.  Patient takes this on a regular basis to keep his muscle fasciculations and epilepsy under control.  We will renew that today.  Obtained and reviewed patient utilization report from [facility] pharmacy database on 2/3/2020 8:52 AM  prior to writing prescription for controlled substance II, III or IV per [place] bill . Based on assessment of the report,my physical exam if necessary and the patient's health problem, the prescription is medically necessary.

## 2020-03-23 ENCOUNTER — PATIENT MESSAGE (OUTPATIENT)
Dept: MEDICAL GROUP | Facility: MEDICAL CENTER | Age: 35
End: 2020-03-23

## 2020-04-17 ENCOUNTER — APPOINTMENT (OUTPATIENT)
Dept: MEDICAL GROUP | Facility: MEDICAL CENTER | Age: 35
End: 2020-04-17
Payer: COMMERCIAL

## 2020-04-24 ENCOUNTER — TELEPHONE (OUTPATIENT)
Dept: HEALTH INFORMATION MANAGEMENT | Facility: OTHER | Age: 35
End: 2020-04-24

## 2020-04-24 NOTE — TELEPHONE ENCOUNTER
"1. Caller Name: Sidney Culver                        Call Back Number: [phone]  [facility] PCP or Specialty Provider: Yes Adelina Pearce        2.  Does patient have any active symptoms of respiratory illness (fever OR cough OR shortness of breath OR sore throat)? Yes, the patient reports the following respiratory symptoms: fever of at least 100.4°F (38°C) or greater, shortness of breath, sore throat and body aches and pains, headache, Nausea and diarrhea.  .    3.  Does patient have any comoribidities? None   Migraines, epilepsy    4.  Has the patient traveled in the last 14 days OR had any known contact with someone who is suspected or confirmed to have COVID-19?  No.    5. Disposition: Advised to go to  for respiratory symptoms.  Patient stated he would like to continue with virtual visit which is not related to respiratory symptoms.      Low grade fever off an on for the last 2 months.  Sinuses feel like they are \"on fire\".  Chest feels tight. Encouraged patient to go to [facility].      Note routed to [facility] Provider: FYI only.         "

## 2020-04-30 ENCOUNTER — TELEMEDICINE (OUTPATIENT)
Dept: MEDICAL GROUP | Facility: PHYSICIAN GROUP | Age: 35
End: 2020-04-30
Payer: COMMERCIAL

## 2020-04-30 VITALS — BODY MASS INDEX: 18.15 KG/M2 | WEIGHT: 134 LBS | HEIGHT: 72 IN

## 2020-04-30 DIAGNOSIS — R25.3 MUSCLE TWITCHING: ICD-10-CM

## 2020-04-30 DIAGNOSIS — M79.18 MUSCLE PAIN, MYOFASCIAL: ICD-10-CM

## 2020-04-30 PROCEDURE — 99214 OFFICE O/P EST MOD 30 MIN: CPT | Mod: 95,CR | Performed by: FAMILY MEDICINE

## 2020-04-30 RX ORDER — DIAZEPAM 2 MG/1
2 TABLET ORAL 2 TIMES DAILY
Qty: 60 TAB | Refills: 2 | Status: SHIPPED | OUTPATIENT
Start: 2020-05-03 | End: 2020-08-11 | Stop reason: SDUPTHER

## 2020-04-30 ASSESSMENT — PATIENT HEALTH QUESTIONNAIRE - PHQ9
5. POOR APPETITE OR OVEREATING: 2 - MORE THAN HALF THE DAYS
SUM OF ALL RESPONSES TO PHQ QUESTIONS 1-9: 10
CLINICAL INTERPRETATION OF PHQ2 SCORE: 1

## 2020-04-30 ASSESSMENT — FIBROSIS 4 INDEX: FIB4 SCORE: 0.51

## 2020-04-30 NOTE — ASSESSMENT & PLAN NOTE
This is a chronic problem for this patient for which he utilizes Valium on a twice daily basis.  He ends up getting so much twitching that he actually develops myofascial pain.  He does have an upcoming appointment at [facility] on 6/10/2020 to better determine why he has the twitching and what could be done to try and help it.  He has seen neurology here in [place] and they been unable to discover why he has this problem and therefore cannot come up with anything to make it better.

## 2020-04-30 NOTE — PROGRESS NOTES
Telemedicine Visit: Established Patient     This encounter was conducted via Zoom .   Verbal consent was obtained. Patient's identity was verified.  As a means of avoiding spread of COVID-19, this visit is being conducted by Telemedicine.         Subjective:   CC: Diagnoses of Muscle pain, myofascial and Muscle twitching were pertinent to this visit.    Sidney Culver is a 35 y.o. male presenting for evaluation and management of:      Muscle pain, myofascial  This is a chronic problem for this patient for which he utilizes Valium on a twice daily basis.  He ends up getting so much twitching that he actually develops myofascial pain.  He does have an upcoming appointment at [facility] on 6/10/2020 to better determine why he has the twitching and what could be done to try and help it.  He has seen neurology here in [place] and they been unable to discover why he has this problem and therefore cannot come up with anything to make it better.    Muscle twitching  This is become a chronic problem for this patient that initially we thought was some type of a partial seizure.  Patient had an EEG that showed this is not seizure activity but he has muscle twitching that without Valium twice a day he will have the twitches to the point of actually causing pain making it impossible for him to do his own ADLs.  He states that if he misses one dose of his Valium he can usually still get through the day but when he misses that second dose then the twitching becomes so significant that he cannot do his own ADLs and it takes several days of being back on the Valium before it is under control.  Patient is due for refill of his Valium at this time. Obtained and reviewed patient utilization report from [facility] pharmacy database on 4/30/2020 1:47 PM  prior to writing prescription for controlled substance II, III or IV per [place] bill . Based on assessment of the report,my physical exam if necessary and the patient's  health problem, the prescription is medically necessary.         ROS:     - Constitutional: Positive for recent low-grade fever after exposure to COVID positive person, denies weight change fatigue or weakness.     - HEENT: Positive for recent sore throat that is now better, otherwise denies headaches, vision changes, hearing changes, ear pain, ear discharge, rhinorrhea, sinus congestion,and neck pain.      - Respiratory: Negative for cough, sputum production, chest congestion, dyspnea, wheezing, and crackles.      - Cardiovascular: Negative for chest pain, palpitations, orthopnea, and bilateral lower extremity edema.     - Gastrointestinal: Negative for heartburn, nausea, vomiting, abdominal pain, hematochezia, melena, diarrhea, constipation, and greasy/foul-smelling stools.     - Genitourinary: Negative for dysuria, polyuria, hematuria, pyuria, urinary urgency, and urinary incontinence.     - Musculoskeletal: Positive for swelling and pain.      - Skin: Negative for rash, itching, cyanotic skin color change.     - Neurological: Negative for dizziness, tingling, tremors, focal sensory deficit, focal weakness and headaches.     - Endo/Heme/Allergies: Does not bruise/bleed easily.     - Psychiatric/Behavioral: Negative for depression, suicidal/homicidal ideation and memory loss.          - NOTE: All other systems reviewed and are negative, except as in HPI.      Patient Active Problem List    Diagnosis Date Noted   • Muscle twitching 04/30/2020   • Chronic tension headache 08/30/2019   • Chronic midline thoracic back pain 08/05/2019   • Recurrent major depressive disorder, in remission (HCC) 03/13/2019   • Muscle pain, myofascial 11/06/2018   • Migraine with aura and without status migrainosus, not intractable 04/02/2018   • H/O juvenile rheumatoid arthritis 04/02/2018   • Nonintractable generalized idiopathic epilepsy without status epilepticus (HCC) 04/02/2018      Allergies:Acetaminophen; Amitriptyline; and Other  drug    Current Outpatient Medications   Medication Sig Dispense Refill   • [START ON 5/3/2020] diazePAM (VALIUM) 2 MG Tab Take 1 Tab by mouth 2 Times a Day for 90 days. 60 Tab 2   • gabapentin (NEURONTIN) 300 MG Cap TK 2 CS PO TID     • famotidine (PEPCID) 20 MG Tab Take 1 Tab by mouth 2 times a day. 180 Tab 3   • DULoxetine (CYMBALTA) 30 MG Cap DR Particles Take 1 Cap by mouth every day. 90 Cap 3   • sumatriptan (IMITREX) 100 MG tablet Take 1 Tab by mouth Once PRN for Migraine for up to 1 dose. 10 Tab 3   • Magnesium 500 MG Tab Taking 1 daily and then 1 with the onset of a migraine. 100 Tab 3   • asa/apap/caffeine (EXCEDRIN) 250-250-65 MG Tab Take 1 Tab by mouth every 6 hours as needed for Headache.     • Diclofenac Sodium 1 % Gel Apply to painful muscles up to qid 200 g 11     No current facility-administered medications for this visit.        Social History     Tobacco Use   • Smoking status: Former Smoker     Packs/day: 0.00     Years: 4.00     Pack years: 0.00     Types: Cigarettes     Last attempt to quit: 2017     Years since quittin.9   • Smokeless tobacco: Never Used   • Tobacco comment: Former Hookah   Substance Use Topics   • Alcohol use: No     Comment: Rarely   • Drug use: No     Social History     Social History Narrative   • Not on file       Family History   Problem Relation Age of Onset   • Lung Disease Mother         COPD in a smoker   • Other Mother         epilepsy (late 30s), migraines    • Arthritis Mother    • No Known Problems Sister           Objective:   Vitals obtained by patient:  Pulse: 70 and Respirations through observation: 14  Ht 1.829 m (6')   Wt 60.8 kg (134 lb)   Body mass index is 18.17 kg/m².    Physical Exam:  Constitutional: Alert, no distress, well-groomed.  Skin: No rashes in visible areas.  Eye: Round. Conjunctiva clear, lids normal. No icterus.   ENMT: Lips pink without lesions, good dentition, moist mucous membranes. Phonation normal.  Neck: No masses, no  thyromegaly. Moves freely without pain.  CV: Pulse as reported by patient  Respiratory: Unlabored respiratory effort, no cough or audible wheeze  Psych: Alert and oriented x3, normal affect and mood.       Assessment and Plan:   The following treatment plan was discussed:     1. Muscle pain, myofascial  Adequately controlled with use of Valium.  Patient due for refill at this time.  His  report is appropriate.  We will fill him for the coming 90 days.  He will be seen at [facility] for further evaluation regarding myofascial muscle pain and muscle twitching on 6/10/2020.  - diazePAM (VALIUM) 2 MG Tab; Take 1 Tab by mouth 2 Times a Day for 90 days.  Dispense: 60 Tab; Refill: 2    2. Muscle twitching  Adequately controlled with current use of Valium.  Renewing for the coming 90 days.      Patient was seen for 25 minutes face to face of which, 20 minutes was spent counseling regarding discussion of medications interactions and side effects, discussion of his long-term disability and the fact that his employer is asking him to apply for SSI.  Plans for the upcoming visit to [place].  Also discussion of how to get immunology testing for his possible exposure to COVID.      Follow-up: 85 days to refill meds

## 2020-04-30 NOTE — ASSESSMENT & PLAN NOTE
This is become a chronic problem for this patient that initially we thought was some type of a partial seizure.  Patient had an EEG that showed this is not seizure activity but he has muscle twitching that without Valium twice a day he will have the twitches to the point of actually causing pain making it impossible for him to do his own ADLs.  He states that if he misses one dose of his Valium he can usually still get through the day but when he misses that second dose then the twitching becomes so significant that he cannot do his own ADLs and it takes several days of being back on the Valium before it is under control.  Patient is due for refill of his Valium at this time. Obtained and reviewed patient utilization report from [facility] pharmacy database on 4/30/2020 1:47 PM  prior to writing prescription for controlled substance II, III or IV per [place] bill . Based on assessment of the report,my physical exam if necessary and the patient's health problem, the prescription is medically necessary.

## 2020-08-11 ENCOUNTER — TELEMEDICINE (OUTPATIENT)
Dept: MEDICAL GROUP | Facility: PHYSICIAN GROUP | Age: 35
End: 2020-08-11
Payer: COMMERCIAL

## 2020-08-11 VITALS — BODY MASS INDEX: 18.15 KG/M2 | HEIGHT: 72 IN | WEIGHT: 134 LBS

## 2020-08-11 DIAGNOSIS — M79.18 MUSCLE PAIN, MYOFASCIAL: ICD-10-CM

## 2020-08-11 DIAGNOSIS — R25.3 MUSCLE TWITCHING: ICD-10-CM

## 2020-08-11 PROCEDURE — 99214 OFFICE O/P EST MOD 30 MIN: CPT | Mod: 95,CR | Performed by: FAMILY MEDICINE

## 2020-08-11 RX ORDER — DIAZEPAM 2 MG/1
2 TABLET ORAL 2 TIMES DAILY
Qty: 60 TAB | Refills: 2 | Status: SHIPPED | OUTPATIENT
Start: 2020-08-11 | End: 2020-11-09 | Stop reason: SDUPTHER

## 2020-08-11 ASSESSMENT — FIBROSIS 4 INDEX: FIB4 SCORE: 0.51

## 2020-08-11 NOTE — PROGRESS NOTES
Telemedicine Visit: Established Patient     This encounter was conducted via [employer].   Verbal consent was obtained. Patient's identity was verified.  As a means of avoiding spread of COVID-19, this visit is being conducted by Telemedicine.         Subjective:   CC: Diagnoses of Muscle pain, myofascial and Muscle twitching were pertinent to this visit.    Sidney Culver is a 35 y.o. male presenting for evaluation and management of:      Muscle pain, myofascial  This patient continues to suffer with myofascial muscle pain and twitching.  When he utilizes diazepam he can keep this under better control.  It is time for his refill which is appropriate at this time.    Muscle twitching  This is a chronic health problem that is well controlled with current medications and lifestyle measures.  This patient has been experiencing this since approximately 1/2019.  We tried multiple medications finally ended up having to utilize diazepam.  This works well for him.  We got off on his refills which were actually due as of 8/1/2020.  Obtained and reviewed patient utilization report from [facility] pharmacy database on 8/11/2020 3:35 PM  prior to writing prescription for controlled substance II, III or IV per [place] bill . Based on assessment of the report,my physical exam if necessary and the patient's health problem, the prescription is medically necessary.         ROS:     - Constitutional:  Negative for fever, chills, unexpected weight change, and fatigue/generalized weakness.    - HEENT:  Negative for headaches, vision changes, hearing changes, ear pain, ear discharge, rhinorrhea, sinus congestion, sore throat, and neck pain.      - Respiratory: Negative for cough, sputum production, chest congestion, dyspnea, wheezing, and crackles.      - Cardiovascular: Negative for chest pain, palpitations, orthopnea, and bilateral lower extremity edema.     - Gastrointestinal: Negative for heartburn, nausea, vomiting,  abdominal pain, hematochezia, melena, diarrhea, constipation, and greasy/foul-smelling stools.     - Genitourinary: Negative for dysuria, polyuria, hematuria, pyuria, urinary urgency, and urinary incontinence.     - Musculoskeletal: Patient continuing to experiencing muscle twitches and muscle pain.     - Skin: Negative for rash, itching, cyanotic skin color change.     - Neurological: Positive for worsening migraines, awaiting Botox injections.     - Endo/Heme/Allergies: Does not bruise/bleed easily.     - Psychiatric/Behavioral: Negative for depression, suicidal/homicidal ideation and memory loss.          - NOTE: All other systems reviewed and are negative, except as in HPI.      Patient Active Problem List    Diagnosis Date Noted   • Muscle twitching 04/30/2020   • Chronic tension headache 08/30/2019   • Chronic midline thoracic back pain 08/05/2019   • Recurrent major depressive disorder, in remission (HCC) 03/13/2019   • Muscle pain, myofascial 11/06/2018   • Migraine with aura and without status migrainosus, not intractable 04/02/2018   • H/O juvenile rheumatoid arthritis 04/02/2018   • Nonintractable generalized idiopathic epilepsy without status epilepticus (HCC) 04/02/2018      Allergies:Acetaminophen, Amitriptyline, and Other drug    Current Outpatient Medications   Medication Sig Dispense Refill   • diazePAM (VALIUM) 2 MG Tab Take 1 Tab by mouth 2 Times a Day for 90 days. 60 Tab 2   • gabapentin (NEURONTIN) 300 MG Cap TK 2 CS PO TID     • famotidine (PEPCID) 20 MG Tab Take 1 Tab by mouth 2 times a day. 180 Tab 3   • DULoxetine (CYMBALTA) 30 MG Cap DR Particles Take 1 Cap by mouth every day. 90 Cap 3   • sumatriptan (IMITREX) 100 MG tablet Take 1 Tab by mouth Once PRN for Migraine for up to 1 dose. 10 Tab 3   • Magnesium 500 MG Tab Taking 1 daily and then 1 with the onset of a migraine. 100 Tab 3   • asa/apap/caffeine (EXCEDRIN) 250-250-65 MG Tab Take 1 Tab by mouth every 6 hours as needed for Headache.      • Diclofenac Sodium 1 % Gel Apply to painful muscles up to qid 200 g 11     No current facility-administered medications for this visit.        Social History     Tobacco Use   • Smoking status: Former Smoker     Packs/day: 0.00     Years: 4.00     Pack years: 0.00     Types: Cigarettes     Quit date: 5/28/2017     Years since quitting: 3.2   • Smokeless tobacco: Never Used   • Tobacco comment: Former Hookah   Substance Use Topics   • Alcohol use: No     Comment: Rarely   • Drug use: No     Social History     Social History Narrative   • Not on file       Family History   Problem Relation Age of Onset   • Lung Disease Mother         COPD in a smoker   • Other Mother         epilepsy (late 30s), migraines    • Arthritis Mother    • No Known Problems Sister           Objective:   Vitals obtained by patient:  Pulse: 75 and Temp: 99.0  Ht 1.829 m (6')   Wt 60.8 kg (134 lb)   Body mass index is 18.17 kg/m².    Physical Exam:  Constitutional: Alert, no distress, well-groomed.  Skin: No rashes in visible areas.  Eye: Round. Conjunctiva clear, lids normal. No icterus.   ENMT: Lips pink without lesions, good dentition, moist mucous membranes. Phonation normal.  Neck: No masses, no thyromegaly. Moves freely without pain.  CV: Pulse as reported by patient  Respiratory: Unlabored respiratory effort, no cough or audible wheeze  Psych: Alert and oriented x3, normal affect and mood.       Assessment and Plan:   The following treatment plan was discussed:     1. Muscle pain, myofascial  Controlled, continue with current meds and lifestyle.  We will continue to utilize diazepam and we will set him up to return in 90 days  - diazePAM (VALIUM) 2 MG Tab; Take 1 Tab by mouth 2 Times a Day for 90 days.  Dispense: 60 Tab; Refill: 2    2. Muscle twitching  Controlled, continue with current meds and lifestyle.    - diazePAM (VALIUM) 2 MG Tab; Take 1 Tab by mouth 2 Times a Day for 90 days.  Dispense: 60 Tab; Refill:  2            Follow-up: 3 months

## 2020-08-11 NOTE — ASSESSMENT & PLAN NOTE
This is a chronic health problem that is well controlled with current medications and lifestyle measures.  This patient has been experiencing this since approximately 1/2019.  We tried multiple medications finally ended up having to utilize diazepam.  This works well for him.  We got off on his refills which were actually due as of 8/1/2020.  Obtained and reviewed patient utilization report from [facility] pharmacy database on 8/11/2020 3:35 PM  prior to writing prescription for controlled substance II, III or IV per [place] bill . Based on assessment of the report,my physical exam if necessary and the patient's health problem, the prescription is medically necessary.

## 2020-08-11 NOTE — ASSESSMENT & PLAN NOTE
This patient continues to suffer with myofascial muscle pain and twitching.  When he utilizes diazepam he can keep this under better control.  It is time for his refill which is appropriate at this time.

## 2020-10-13 RX ORDER — SUMATRIPTAN 100 MG/1
100 TABLET, FILM COATED ORAL
Qty: 10 TAB | Refills: 3 | Status: SHIPPED | OUTPATIENT
Start: 2020-10-13

## 2020-11-09 DIAGNOSIS — M79.18 MUSCLE PAIN, MYOFASCIAL: ICD-10-CM

## 2020-11-09 DIAGNOSIS — R25.3 MUSCLE TWITCHING: ICD-10-CM

## 2020-11-09 RX ORDER — DIAZEPAM 2 MG/1
2 TABLET ORAL 2 TIMES DAILY
Qty: 60 TAB | Refills: 2 | Status: SHIPPED | OUTPATIENT
Start: 2020-11-09 | End: 2020-12-09 | Stop reason: SDUPTHER

## 2020-11-09 NOTE — TELEPHONE ENCOUNTER
----- Message from Sidney Culver sent at 11/6/2020 12:07 PM PST -----  Regarding: Medication Renewal Request  Contact: [phone]  Refills have been requested for the following medications:        diazePAM (VALIUM) 2 MG Tab [Adelina Pearce M.D.]    Preferred pharmacy: [id number] - [place] - [address] [facility] AT [address]  Delivery method: Pickup

## 2020-11-30 ENCOUNTER — PATIENT MESSAGE (OUTPATIENT)
Dept: MEDICAL GROUP | Facility: PHYSICIAN GROUP | Age: 35
End: 2020-11-30

## 2020-12-07 ENCOUNTER — PATIENT MESSAGE (OUTPATIENT)
Dept: MEDICAL GROUP | Facility: PHYSICIAN GROUP | Age: 35
End: 2020-12-07

## 2020-12-09 ENCOUNTER — PATIENT MESSAGE (OUTPATIENT)
Dept: MEDICAL GROUP | Facility: PHYSICIAN GROUP | Age: 35
End: 2020-12-09

## 2020-12-09 DIAGNOSIS — R25.3 MUSCLE TWITCHING: ICD-10-CM

## 2020-12-09 DIAGNOSIS — M79.18 MUSCLE PAIN, MYOFASCIAL: ICD-10-CM

## 2020-12-09 RX ORDER — DIAZEPAM 2 MG/1
2 TABLET ORAL 2 TIMES DAILY
Qty: 180 TAB | Refills: 0 | Status: SHIPPED | OUTPATIENT
Start: 2020-12-09 | End: 2021-03-12 | Stop reason: SDUPTHER

## 2020-12-09 NOTE — TELEPHONE ENCOUNTER
From: Sidney Culver  To: Adelina Pearce M.D.  Sent: 12/9/2020 8:53 AM PST  Subject: Non-Urgent Medical Question    They did not, unfortunately      ----- Message -----   From:Adelina Pearce M.D.   Sent:12/9/2020 8:52 AM PST   To:Sidney Culver   Subject:RE: Non-Urgent Medical Question    I just looked at the last prescription I wrote for you on 11/7/2020 and it had refills to get you through February 2021? Did they not give you the refills?  Thank you,  Dr. BRADY      ----- Message -----   From:Sidney Culver   Sent:12/7/2020 12:45 PM PST   To:Adelina Pearce M.D.   Subject:Non-Urgent Medical Question    I've had the paperwork all faxed over. There are two different forms, one which goes to my claim handler and one which goes elsewhere. I was checking to make sure your office received both.

## 2020-12-09 NOTE — PROGRESS NOTES
Patient was coming in today to get a refill of his diazepam because the last prescription I wrote for him, had 2 refills in the pharmacy did not allow him to pick those up.  I have sustained an injury and him having to leave the office urgently.  I have written the prescription and sent it to the patient's pharmacy for a full 90-day supply.  This gentleman has been on this medication long-term and never has required any early fills or had any problems.

## 2020-12-10 NOTE — TELEPHONE ENCOUNTER
From: Sidney Culver  To: Adelina Pearce M.D.  Sent: 12/9/2020 10:20 AM PST  Subject: Non-Urgent Medical Question    I'll be careful. Hopefully this time they will fill it. This wouldn't be the first time they've done this with a prescription.      ----- Message -----   From:Adelina Pearce M.D.   Sent:12/9/2020 10:09 AM PST   To:Sidney Culver   Subject:RE: Non-Urgent Medical Question    I will have it start today and give you all the 90 day supply, please be careful.  Thank you,  Dr. BRADY      ----- Message -----   From:Sidney Culver   Sent:12/9/2020 8:53 AM PST   To:Adelina Pearce M.D.   Subject:Non-Urgent Medical Question    They did not, unfortunately      ----- Message -----   From:Adelina Pearce M.D.   Sent:12/9/2020 8:52 AM PST   To:Sidney Culver   Subject:RE: Non-Urgent Medical Question    I just looked at the last prescription I wrote for you on 11/7/2020 and it had refills to get you through February 2021? Did they not give you the refills?  Thank you,  Dr. BRADY      ----- Message -----   From:Sidney Culver   Sent:12/7/2020 12:45 PM PST   To:Adelina Pearce M.D.   Subject:Non-Urgent Medical Question    I've had the paperwork all faxed over. There are two different forms, one which goes to my claim handler and one which goes elsewhere. I was checking to make sure your office received both.

## 2020-12-15 ENCOUNTER — TELEMEDICINE (OUTPATIENT)
Dept: MEDICAL GROUP | Facility: PHYSICIAN GROUP | Age: 35
End: 2020-12-15
Payer: COMMERCIAL

## 2020-12-15 VITALS — HEIGHT: 72 IN | BODY MASS INDEX: 18.17 KG/M2 | TEMPERATURE: 99 F

## 2020-12-15 DIAGNOSIS — G43.111 INTRACTABLE MIGRAINE WITH AURA WITH STATUS MIGRAINOSUS: ICD-10-CM

## 2020-12-15 DIAGNOSIS — R25.3 MUSCLE TWITCHING: ICD-10-CM

## 2020-12-15 PROCEDURE — 99215 OFFICE O/P EST HI 40 MIN: CPT | Mod: 95,CR | Performed by: FAMILY MEDICINE

## 2020-12-15 NOTE — PROGRESS NOTES
This evaluation was conducted via Zoom using secure and encrypted videoconferencing technology. The patient was in a private location in the state [facility].    The patient's identity was confirmed and verbal consent was obtained for this virtual visit.        CC:Diagnoses of Intractable migraine with aura with status migrainosus and Muscle twitching were pertinent to this visit.      HISTORY OF PRESENT ILLNESS: Patient is a 35 y.o. male established patient who presents today to complete paperwork for his long-term disability including the attending physician's statement for waiver premium as well as the long-term disability physician headache questionnaire.      Intractable migraine with aura with status migrainosus  Is a chronic health problem for this patient that has been ongoing since May 2019.  The severity of the chronic migraine has kept him from being able to be gainfully employed.  He is currently on long-term disability and we will complete paperwork for that today.  We completed both the long-term disability headache questionnaire as well as the attending physician statement for waiver of premium.  In talking with the patient he is at his baseline which is a daily constant headache that he rates as 3-4/10 and then spikes of headaches that will occur depending on his schedule, his eating schedule or stress.  Though spiking headaches can go anywhere from 6 up to 9/10.  The patient is currently on diazepam 2 mg twice daily and finds that that has lowered the intensity of his headaches as well as helped with some of the muscle twitching he was experiencing.  Patient will also continue on gabapentin 300 mg taking to 3 times a day.    The patient did have a consultation with the [place] headache clinic and their recommendation was that he had a excellent work-up and the only thing they had to offer would be to try and get him into Botox injections.  There has been a referral placed for him to be able to do  that at the [facility] headache clinic with Roxie Savage PA-C.    Muscle twitching  This is a chronic health problem for this patient that he reports the diazepam 2 mg twice daily has greatly improved.  He finds with utilizing that medication regularly he does not have nearly the twitching he had previously.  He would like to continue with that medication and it is appropriate for him to continue with that.  His  report has been consistent with the use he has told us about.      Patient Active Problem List    Diagnosis Date Noted   • Muscle twitching 04/30/2020   • Chronic tension headache 08/30/2019   • Chronic midline thoracic back pain 08/05/2019   • Recurrent major depressive disorder, in remission (HCC) 03/13/2019   • Muscle pain, myofascial 11/06/2018   • Intractable migraine with aura with status migrainosus 04/02/2018   • H/O juvenile rheumatoid arthritis 04/02/2018   • Nonintractable generalized idiopathic epilepsy without status epilepticus (HCC) 04/02/2018      Allergies:Acetaminophen, Amitriptyline, and Other drug    Current Outpatient Medications   Medication Sig Dispense Refill   • diazePAM (VALIUM) 2 MG Tab Take 1 Tab by mouth 2 Times a Day for 90 days. 180 Tab 0   • sumatriptan (IMITREX) 100 MG tablet Take 1 Tab by mouth Once PRN for Migraine for up to 1 dose. 10 Tab 3   • gabapentin (NEURONTIN) 300 MG Cap TK 2 CS PO TID     • famotidine (PEPCID) 20 MG Tab Take 1 Tab by mouth 2 times a day. 180 Tab 3   • DULoxetine (CYMBALTA) 30 MG Cap DR Particles Take 1 Cap by mouth every day. 90 Cap 3   • Magnesium 500 MG Tab Taking 1 daily and then 1 with the onset of a migraine. 100 Tab 3   • asa/apap/caffeine (EXCEDRIN) 250-250-65 MG Tab Take 1 Tab by mouth every 6 hours as needed for Headache.     • Diclofenac Sodium 1 % Gel Apply to painful muscles up to qid 200 g 11     No current facility-administered medications for this visit.        Social History     Tobacco Use   • Smoking status: Former Smoker      Packs/day: 0.00     Years: 4.00     Pack years: 0.00     Types: Cigarettes     Quit date: 5/28/2017     Years since quitting: 3.5   • Smokeless tobacco: Never Used   • Tobacco comment: Former Hookah   Substance Use Topics   • Alcohol use: No     Comment: Rarely   • Drug use: No     Social History     Social History Narrative   • Not on file       Family History   Problem Relation Age of Onset   • Lung Disease Mother         COPD in a smoker   • Other Mother         epilepsy (late 30s), migraines    • Arthritis Mother    • No Known Problems Sister        Review of Systems:      - Constitutional: Patient complains of generalized weakness and fatigue denies fevers chills or weight loss     - HEENT: Negative for headaches, vision changes, hearing changes, ear pain, ear discharge, rhinorrhea, sinus congestion, sore throat, and neck pain.      - Respiratory: Negative for cough, sputum production, chest congestion, dyspnea, wheezing, and crackles.      - Cardiovascular: Negative for chest pain, palpitations, orthopnea, and bilateral lower extremity edema.     - Gastrointestinal: Negative for heartburn, nausea, vomiting, abdominal pain, hematochezia, melena, diarrhea, constipation, and greasy/foul-smelling stools.     - Genitourinary: Negative for dysuria, polyuria, hematuria, pyuria, urinary urgency, and urinary incontinence.    - Musculoskeletal: Patient has noted decrease in his overall stamina and muscle strength secondary to his long-term disability and being unable to be physically active with any regularity.      - Skin: Negative for rash, itching, cyanotic skin color change.     - Neurological: Positive for intractable status migrainous headaches with muscle twitching, neck pain and problems with concentration.     - Endo/Heme/Allergies: Does not bruise/bleed easily.     - Psychiatric/Behavioral: Negative for depression, suicidal/homicidal ideation and memory loss.          - NOTE: All other systems reviewed and are  negative, except as in HPI.    Exam:    Temp 37.2 °C (99 °F) (Temporal)   Ht 1.829 m (6')  Body mass index is 18.17 kg/m².    General:  Well nourished, well developed male in NAD      Patient was seen for 40 minutes face to face of which, 35 minutes was spent counseling regarding the above mentioned problems and to complete his paperwork needed by his long-term disability company.  We needed to go over his history in depth as well as his symptomatology to complete that..  Assessment/Plan:  1. Intractable migraine with aura with status migrainosus  Basically patient is at his baseline which I would not considered controlled.  Patient lives with an intractable migraine and muscle twitching that is improved with current medication but certainly has not been abolished    2. Muscle twitching  Patient is at his baseline

## 2020-12-15 NOTE — ASSESSMENT & PLAN NOTE
This is a chronic health problem for this patient that he reports the diazepam 2 mg twice daily has greatly improved.  He finds with utilizing that medication regularly he does not have nearly the twitching he had previously.  He would like to continue with that medication and it is appropriate for him to continue with that.  His  report has been consistent with the use he has told us about.

## 2020-12-15 NOTE — ASSESSMENT & PLAN NOTE
Is a chronic health problem for this patient that has been ongoing since May 2019.  The severity of the chronic migraine has kept him from being able to be gainfully employed.  He is currently on long-term disability and we will complete paperwork for that today.  We completed both the long-term disability headache questionnaire as well as the attending physician statement for waiver of premium.  In talking with the patient he is at his baseline which is a daily constant headache that he rates as 3-4/10 and then spikes of headaches that will occur depending on his schedule, his eating schedule or stress.  Though spiking headaches can go anywhere from 6 up to 9/10.  The patient is currently on diazepam 2 mg twice daily and finds that that has lowered the intensity of his headaches as well as helped with some of the muscle twitching he was experiencing.  Patient will also continue on gabapentin 300 mg taking to 3 times a day.    The patient did have a consultation with the [place] headache clinic and their recommendation was that he had a excellent work-up and the only thing they had to offer would be to try and get him into Botox injections.  There has been a referral placed for him to be able to do that at the [facility] headache clinic with Roxie Savage PA-C.

## 2020-12-18 ENCOUNTER — PATIENT MESSAGE (OUTPATIENT)
Dept: MEDICAL GROUP | Facility: PHYSICIAN GROUP | Age: 35
End: 2020-12-18

## 2021-01-08 RX ORDER — DULOXETIN HYDROCHLORIDE 30 MG/1
30 CAPSULE, DELAYED RELEASE ORAL DAILY
Qty: 90 CAP | Refills: 0 | Status: SHIPPED | OUTPATIENT
Start: 2021-01-08 | End: 2021-03-11 | Stop reason: SDUPTHER

## 2021-01-20 RX ORDER — FAMOTIDINE 20 MG/1
20 TABLET, FILM COATED ORAL 2 TIMES DAILY
Qty: 60 TAB | Refills: 0 | Status: SHIPPED | OUTPATIENT
Start: 2021-01-20 | End: 2021-03-01

## 2021-02-09 ENCOUNTER — PATIENT OUTREACH (OUTPATIENT)
Dept: MEDICAL GROUP | Facility: MEDICAL CENTER | Age: 36
End: 2021-02-09

## 2021-02-09 NOTE — PROGRESS NOTES
Left message for patient to call back regarding pre-visit planning. Please transfer call to Melinda at [phone] or Anastasiia at [phone].    Message left informing patient of appointment and need to complete documentation. Given [phone] number.

## 2021-03-01 RX ORDER — FAMOTIDINE 20 MG/1
TABLET, FILM COATED ORAL
Qty: 60 TABLET | Refills: 0 | Status: SHIPPED | OUTPATIENT
Start: 2021-03-01 | End: 2021-03-02

## 2021-03-02 RX ORDER — FAMOTIDINE 20 MG/1
TABLET, FILM COATED ORAL
Qty: 180 TABLET | Refills: 0 | Status: SHIPPED | OUTPATIENT
Start: 2021-03-02 | End: 2021-03-30

## 2021-03-03 ENCOUNTER — OFFICE VISIT (OUTPATIENT)
Dept: NEUROLOGY | Facility: MEDICAL CENTER | Age: 36
End: 2021-03-03
Attending: NURSE PRACTITIONER
Payer: COMMERCIAL

## 2021-03-03 VITALS
BODY MASS INDEX: 20.01 KG/M2 | TEMPERATURE: 98.4 F | HEART RATE: 100 BPM | OXYGEN SATURATION: 96 % | HEIGHT: 72 IN | WEIGHT: 147.71 LBS | DIASTOLIC BLOOD PRESSURE: 72 MMHG | RESPIRATION RATE: 16 BRPM | SYSTOLIC BLOOD PRESSURE: 110 MMHG

## 2021-03-03 DIAGNOSIS — R76.8 ANA POSITIVE: ICD-10-CM

## 2021-03-03 DIAGNOSIS — R25.3 MUSCLE TWITCHING: ICD-10-CM

## 2021-03-03 DIAGNOSIS — G43.719 INTRACTABLE CHRONIC MIGRAINE WITHOUT AURA AND WITHOUT STATUS MIGRAINOSUS: ICD-10-CM

## 2021-03-03 PROCEDURE — 99215 OFFICE O/P EST HI 40 MIN: CPT | Performed by: NURSE PRACTITIONER

## 2021-03-03 PROCEDURE — 99211 OFF/OP EST MAY X REQ PHY/QHP: CPT | Performed by: NURSE PRACTITIONER

## 2021-03-03 RX ORDER — KETOROLAC TROMETHAMINE 10 MG/1
10 TABLET, FILM COATED ORAL EVERY 6 HOURS PRN
Qty: 20 TABLET | Refills: 1 | Status: SHIPPED | OUTPATIENT
Start: 2021-03-03

## 2021-03-03 RX ORDER — ZOLMITRIPTAN 5 MG/1
TABLET, ORALLY DISINTEGRATING ORAL
Qty: 9 TABLET | Refills: 2 | Status: SHIPPED | OUTPATIENT
Start: 2021-03-03 | End: 2021-04-20

## 2021-03-03 ASSESSMENT — FIBROSIS 4 INDEX: FIB4 SCORE: .5268638761782118808

## 2021-03-04 ENCOUNTER — TELEPHONE (OUTPATIENT)
Dept: MEDICAL GROUP | Facility: MEDICAL CENTER | Age: 36
End: 2021-03-04

## 2021-03-04 NOTE — PROGRESS NOTES
Subjective:      Sidney Culver is a 36 y.o. male who presents with Follow-Up (Migraine with aura and without status migrainosus, not intractable)            HPI  Here with Stephon, a male friend.    Has been seen intercurrently per Dr Wu and per Dr Galarza 6/2019.  He has been referred back to our clinic to initiate Botox.     Headache Characteristics:    Sidney Culver started having headaches 10 years ago. These would occur once every few months. Then the frequency started to increase, up to 2-3 days/week. The most bothersome headaches begin on 5/7/19, that is when he had constant headaches. January of 2019 was when jerking motions started occurring. Typically has 30 headache days a month. On average they last all day. The quality of the pain is described as throbbing at times. The patient does not have an associated aura. There is associated nausea and vomiting with the headaches. There is associated photophobia, phonophobia with the headaches. There are not associated autonomic changes such as tearing of the eyes, pupillary changes, runny nose, or nasal congestion. There is not worsening of the headache with position. There is not worsening with valsalva. There is not a temporal association. The patient denies any other new or concerning neurological symptoms.     Bad 6-9/10 days.  3-4 days of migraines per week.    Current headache and related medications and therapies:  Gabapentin 600 TID  Imitrex, 10/month, helps most migraines  Midol, 3-4 times/month  Magnesium 500mg daily  Diazepam has reduced spasms  Duloxetine, helps depression but not headache    Headache medications and therapies tried in the past:  Amitriptyline, abdominal pain  topamax  Valproic acid  Aimovig 70mg, took one dose but ineffective  Diclofenac, for muscle pains  Excedrin, was taking a lot     Diagnosed with childhood rheumatoid arthritis age 6.  Required head gear for his braces.  Required splints for his feet and toes.  History  of NSAID overuse.    5/2019 REEG normal.     Ref. Range 10/18/2019 13:52   Sed Rate Familia Latest Ref Range: 0 - 15 mm/hour 24 (H)     4/2019 Brain MRI with/without IMPRESSION:     1.  No acute intracranial abnormality.  2.  No mass or abnormal enhancement within the brain.  3.  No evidence for mesial temporal sclerosis.      Current Outpatient Medications   Medication Sig Dispense Refill   • famotidine (PEPCID) 20 MG Tab TAKE 1 TABLET BY MOUTH TWICE DAILY 180 tablet 0   • DULoxetine (CYMBALTA) 30 MG Cap DR Particles Take 1 Cap by mouth every day. 90 Cap 0   • diazePAM (VALIUM) 2 MG Tab Take 1 Tab by mouth 2 Times a Day for 90 days. 180 Tab 0   • sumatriptan (IMITREX) 100 MG tablet Take 1 Tab by mouth Once PRN for Migraine for up to 1 dose. 10 Tab 3   • gabapentin (NEURONTIN) 300 MG Cap TK 2 CS PO TID     • Magnesium 500 MG Tab Taking 1 daily and then 1 with the onset of a migraine. 100 Tab 3   • asa/apap/caffeine (EXCEDRIN) 250-250-65 MG Tab Take 1 Tab by mouth every 6 hours as needed for Headache.     • Diclofenac Sodium 1 % Gel Apply to painful muscles up to qid 200 g 11     No current facility-administered medications for this visit.       Review of Systems   Constitutional: Negative.    HENT: Negative for hearing loss, nosebleeds and sore throat.         No recent head injury.   Eyes: Positive for photophobia. Negative for double vision.        No new loss of vision.   Respiratory: Negative for cough.         No recent lung infections.   Cardiovascular: Negative for chest pain.   Gastrointestinal: Negative for abdominal pain, diarrhea, nausea and vomiting.   Genitourinary: Negative.    Musculoskeletal: Negative.    Skin: Negative.    Neurological: Positive for tingling, weakness and headaches.   Endo/Heme/Allergies:        No history of endocrine dysfunction.  No new problems.   Psychiatric/Behavioral: Negative for depression. The patient is not nervous/anxious.         No recent mood changes.           "Objective:     /72 (BP Location: Left arm, Patient Position: Sitting, BP Cuff Size: Adult)   Pulse 100   Temp 36.9 °C (98.4 °F) (Temporal)   Resp 16   Ht 1.829 m (6' 0.01\")   Wt 67 kg (147 lb 11.3 oz)   SpO2 96%   BMI 20.03 kg/m²      Physical Exam  Constitutional:       General: He is not in acute distress.     Appearance: He is well-developed.      Comments: Thin appearance     HENT:      Head: Normocephalic and atraumatic.      Nose: Nose normal.   Eyes:      Pupils: Pupils are equal, round, and reactive to light.      Comments: No double vision or loss of vision.   Cardiovascular:      Rate and Rhythm: Normal rate and regular rhythm.      Heart sounds: Normal heart sounds.      Comments: No recent chest pain.  Pulmonary:      Effort: Pulmonary effort is normal.      Breath sounds: Normal breath sounds.   Abdominal:      Palpations: Abdomen is soft.      Tenderness: There is no abdominal tenderness.   Genitourinary:     Comments: No recent infections.  Musculoskeletal:      Cervical back: Normal range of motion and neck supple.      Comments:  Limited range of motion of the bilateral wrists.  He has deformity at the MCP joints bilaterally.     Lymphadenopathy:      Cervical: No cervical adenopathy.   Skin:     General: Skin is warm and dry.      Coloration: Skin is pale.   Neurological:      Mental Status: He is alert and oriented to person, place, and time.      Cranial Nerves: No cranial nerve deficit.      Motor: No tremor or seizure activity.      Gait: Gait normal.      Deep Tendon Reflexes:      Reflex Scores:       Tricep reflexes are 1+ on the right side and 1+ on the left side.       Bicep reflexes are 1+ on the right side and 1+ on the left side.       Brachioradialis reflexes are 1+ on the right side and 1+ on the left side.       Patellar reflexes are 2+ on the right side and 2+ on the left side.       Achilles reflexes are 0 on the right side and 0 on the left side.     Comments: No " "observable changes in neurologic status.  See initial new patient examination for details.     Psychiatric:         Mood and Affect: Mood is not anxious or depressed.         Speech: Speech normal.             Assessment/Plan:        Chronic Intractable Migraines:  Daily headache pain baseline is 2-3/10, increasing in intensity to that of a migraine 2-3 times per week.  If the pain \"spike\" is high he will use sumatriptan tablet.  If the pain \"spike\" is minimal will then use is Excedrin.    Abortive treatment: sumatriptan, Excedrin    New Rx for Zomig ZMT 5mg provided.  New Rx for toradol 10mg tablets provided.    Obtain labs as ordered.    This patient has chronic daily migraines, defined as having 15 or more headaches days per month, 8 of which are migraines, over a minimum of the last three months. Episodes last more than 4 hours (untreated).  Pt has 2 or more of following (see initial note) -  Headache worsened with activity, pain is moderate to severe intensity, pulsing in nature, unilateral and patient either has nausea/vomiting OR sensitivity to light and sound    This patient does/does not also have medication overuse headache.  However our plan to address this includes education, occipital nerve shots, restricting use as directed.    Previous prophylactic treatments for at least three months have included:  topamax or depakote: gabapentin, depakote, topamax  beta-blockers: none  Venlafaxine or a TCA: cymbalta, amitriyptyline     At this point the only option left would be to use BTX injections for chronic migraine.    Will follow the Botox Paradigm:  Patient injected with 155 units according to the dosing/injection paradigm currently mandated by the FDA for chronic migraine. I injected 10 units of BOTOX divided into 2 sites into the , 5 units into the Procerus. 20 units of Botox divided into 4 units into the Frontalis, 40 units of Botox divided into 8 sites (4 sites to the right Temporalis and 4 " sites to the left Temporalis); 30 units divided into 6 units (3 units to the right Occipitalis and 3 units to left Occipitalis); 20 units divided into 4 units (2 units to the right Cervical paraspinals, 2 units to the left Cervical paraspinals); 30 units of Botox divided into 6 units (3 units to right trapezius, 3 units to the left trapezius). I have wasted 45 units per FDA guidelines.      Return for follow-up for initiation of Botox.     I spent 45+ minutes with this patient, over fifty percent was spent counseling patient on their condition, best management practices, reviewing test results and risks and benefits of treatment.

## 2021-03-05 NOTE — TELEPHONE ENCOUNTER
NEW PATIENT VISIT PRE-VISIT PLANNING    1.  [employer] Patient is checked in Patient Demographics?Yes    2.  Immunizations were updated in Epic using Reconcile Outside Information activity? Yes         3.  Is this appointment scheduled as a Hospital Follow-Up? No    4.  Patient is due for the following Health Maintenance Topics:   Health Maintenance Due   Topic Date Due   • IMM DTaP/Tdap/Td Vaccine (1 - Tdap) Never done   • IMM INFLUENZA (1) 09/01/2020       5.  Reviewed/Updated the following with patient:       •   Preferred Pharmacy? Yes       •   Preferred Lab? Yes       •   Preferred Communication? Yes       •   Allergies? Yes       •   Medications? NO       •   Social History? Yes       •   Family History (document living status of immediate family members and if + hx of  cancer, diabetes, hypertension, hyperlipidemia, heart attack, stroke) No    6.  Updated Care Team?       •   DME Company (gait device, O2, CPAP, etc.) NO       •   Other Specialists (eye doctor, derm, GYN, cardiology, endo, etc): NO

## 2021-03-08 ASSESSMENT — ENCOUNTER SYMPTOMS
SORE THROAT: 0
VOMITING: 0
NERVOUS/ANXIOUS: 0
MUSCULOSKELETAL NEGATIVE: 1
COUGH: 0
TINGLING: 1
DOUBLE VISION: 0
WEAKNESS: 1
PHOTOPHOBIA: 1
DIARRHEA: 0
ABDOMINAL PAIN: 0
HEADACHES: 1
CONSTITUTIONAL NEGATIVE: 1
NAUSEA: 0
DEPRESSION: 0

## 2021-03-11 ENCOUNTER — TELEMEDICINE (OUTPATIENT)
Dept: MEDICAL GROUP | Facility: MEDICAL CENTER | Age: 36
End: 2021-03-11
Payer: COMMERCIAL

## 2021-03-11 VITALS — HEIGHT: 72 IN | BODY MASS INDEX: 20.01 KG/M2 | WEIGHT: 147.71 LBS

## 2021-03-11 DIAGNOSIS — G89.29 CHRONIC MIDLINE THORACIC BACK PAIN: ICD-10-CM

## 2021-03-11 DIAGNOSIS — G40.309 NONINTRACTABLE GENERALIZED IDIOPATHIC EPILEPSY WITHOUT STATUS EPILEPTICUS (HCC): ICD-10-CM

## 2021-03-11 DIAGNOSIS — M79.18 MUSCLE PAIN, MYOFASCIAL: ICD-10-CM

## 2021-03-11 DIAGNOSIS — F33.40 RECURRENT MAJOR DEPRESSIVE DISORDER, IN REMISSION (HCC): ICD-10-CM

## 2021-03-11 DIAGNOSIS — M54.6 CHRONIC MIDLINE THORACIC BACK PAIN: ICD-10-CM

## 2021-03-11 DIAGNOSIS — G43.111 INTRACTABLE MIGRAINE WITH AURA WITH STATUS MIGRAINOSUS: ICD-10-CM

## 2021-03-11 DIAGNOSIS — Z87.39 H/O JUVENILE RHEUMATOID ARTHRITIS: ICD-10-CM

## 2021-03-11 DIAGNOSIS — R25.3 MUSCLE TWITCHING: ICD-10-CM

## 2021-03-11 PROCEDURE — 99214 OFFICE O/P EST MOD 30 MIN: CPT | Performed by: FAMILY MEDICINE

## 2021-03-11 RX ORDER — DIAZEPAM 2 MG/1
2 TABLET ORAL 2 TIMES DAILY
Qty: 180 TABLET | Refills: 0 | Status: CANCELLED | OUTPATIENT
Start: 2021-03-11 | End: 2021-06-09

## 2021-03-11 RX ORDER — DULOXETIN HYDROCHLORIDE 30 MG/1
30 CAPSULE, DELAYED RELEASE ORAL DAILY
Qty: 90 CAPSULE | Refills: 3 | Status: SHIPPED | OUTPATIENT
Start: 2021-03-11 | End: 2022-04-04

## 2021-03-11 ASSESSMENT — FIBROSIS 4 INDEX: FIB4 SCORE: .5268638761782118808

## 2021-03-11 NOTE — PROGRESS NOTES
Virtual Visit: New Patient   This visit was conducted via Zoom using secure and encrypted videoconferencing technology. The patient was in a private location in the state of [place].    The patient's identity was confirmed and verbal consent was obtained for this virtual visit.    Subjective:     CC:   Chief Complaint   Patient presents with   • Establish Care   • Medication Refill       Sidney Culver is a 36 y.o. male presenting to establish care for medication refill.    Recurrent major depressive disorder, in remission (HCC)  This is a chronic health problem for this patient.  He is taking Cymbalta 30 mg once daily.  He reports that his symptoms are controlled with this medication.  Denies any side effects with this medication.    Nonintractable generalized idiopathic epilepsy without status epilepticus (HCC)  This is a chronic health problem for this patient.  He is taking diazepam 2 mg twice daily.  He takes this on a regular basis to keep his muscle twitching and epilepsy under control.    Muscle twitching  This is a chronic health problem for this patient.  He takes diazepam 2 mg twice daily.  His symptoms are controlled with this medication.  Due to muscle twitching and muscle pain he is on long-term disability from his work.  He reports that his work notified him that they will no longer provide long-term disability.  He will get more details about this and will let us know.    Intractable migraine with aura with status migrainosus  This is a chronic health problem for this patient.  He is now following with neurology.  He had CT and MRI brain for which came back negative.  He is going to start Botox injections.  He also takes magnesium for headaches.  He was prescribed Zomig and Toradol from neurology.  Previously he had tried multiple medications and did not have improvement with that.    H/O juvenile rheumatoid arthritis  This is a chronic health problem for this patient.  His work-up in 2019  came back SANJU positive.  Blood work for further evaluation ordered by neurology.  He does not have any joint pain.    Chronic midline thoracic back pain  This is a chronic health problem for this patient.  Reports that he was prescribed Pepcid 20 mg twice daily which helped with his symptoms a lot.  Now he does not get any pain.  He requested refill for medications.    ROS  See HPI  Constitutional: Negative for fever, chills and malaise/fatigue.   HENT: Negative for congestion.    Eyes: Negative for pain.   Respiratory: Negative for cough and shortness of breath.    Cardiovascular: Negative for leg swelling.   Gastrointestinal: Negative for nausea, vomiting, abdominal pain and diarrhea.   Genitourinary: Negative for dysuria and hematuria.   Skin: Negative for rash.   Neurological: Negative for dizziness, focal weakness.  Positive for migraine headaches, muscle twitching  Endo/Heme/Allergies: Does not bruise/bleed easily.   Psychiatric/Behavioral: Negative for depression.  The patient is not nervous/anxious.      Allergies   Allergen Reactions   • Acetaminophen    • Amitriptyline      Abnormal movements   • Other Drug      medrisil       Current medicines (including changes today)  Current Outpatient Medications   Medication Sig Dispense Refill   • diazePAM (VALIUM) 2 MG Tab Take 1 tablet by mouth 2 Times a Day for 90 days. 180 tablet 0   • DULoxetine (CYMBALTA) 30 MG Cap DR Particles Take 1 capsule by mouth every day. 90 capsule 3   • ketorolac (TORADOL) 10 MG Tab Take 1 tablet by mouth every 6 hours as needed for Moderate Pain (at onset of migaine.  Do not exceed more than 2 tablets in 24 hours or take more than 72 hours total.). 20 tablet 1   • zolmitriptan (ZOMIG-ZMT) 5 MG disintegrating tablet Take 1/2-1 tablet po at onset of headache, may repeat dose in 2 hours if unrelieved.  Do not exceed more than 2 tablets in 24 hours. 9 tablet 2   • famotidine (PEPCID) 20 MG Tab TAKE 1 TABLET BY MOUTH TWICE DAILY 180  tablet 0   • sumatriptan (IMITREX) 100 MG tablet Take 1 Tab by mouth Once PRN for Migraine for up to 1 dose. 10 Tab 3   • gabapentin (NEURONTIN) 300 MG Cap TK 2 CS PO TID     • Magnesium 500 MG Tab Taking 1 daily and then 1 with the onset of a migraine. 100 Tab 3   • asa/apap/caffeine (EXCEDRIN) 250-250-65 MG Tab Take 1 Tab by mouth every 6 hours as needed for Headache.     • Diclofenac Sodium 1 % Gel Apply to painful muscles up to qid 200 g 11     No current facility-administered medications for this visit.       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  He  has no past surgical history on file.      Family History   Problem Relation Age of Onset   • Lung Disease Mother         COPD in a smoker   • Other Mother         epilepsy (late 30s), migraines    • Arthritis Mother    • No Known Problems Father    • No Known Problems Sister    • Cancer Maternal Grandfather         lung cancer, smoker     Family Status   Relation Name Status   • Mo  Alive, age 55y   • Fa  Alive, age 55y        no contact   • Sis  (Not Specified)       Patient Active Problem List    Diagnosis Date Noted   • Muscle twitching 04/30/2020   • Chronic tension headache 08/30/2019   • Chronic midline thoracic back pain 08/05/2019   • Recurrent major depressive disorder, in remission (HCC) 03/13/2019   • Muscle pain, myofascial 11/06/2018   • Intractable migraine with aura with status migrainosus 04/02/2018   • H/O juvenile rheumatoid arthritis  04/02/2018   • Nonintractable generalized idiopathic epilepsy without status epilepticus (HCC) 04/02/2018          Objective:   Ht 1.829 m (6')   Wt 67 kg (147 lb 11.3 oz)   BMI 20.03 kg/m²     Physical Exam:  Constitutional: Alert, no distress, well-groomed.  Skin: No rashes in visible areas.  Eye: Round. Conjunctiva clear, lids normal. No icterus.   ENMT: Lips pink without lesions, good dentition, moist mucous membranes. Phonation normal.  Neck: No masses, no thyromegaly. Moves freely without pain.  Respiratory: Unlabored respiratory effort, no cough or audible wheeze  Psych: Alert and oriented x3, normal affect and mood.       Assessment and Plan:   The following treatment plan was discussed:     1. Muscle pain, myofascial  - Controlled Substance Treatment Agreement    2. Muscle twitching  - Controlled Substance Treatment Agreement    Printed controlled substance treatment agreement.  He will come to office to sign this.  Checked , refilled medication.    3. Recurrent major depressive disorder, in remission (HCC) plan stable, continue same medication regimen.  Stable, continue same medication regimen.    - DULoxetine (CYMBALTA) 30 MG Cap DR Particles; Take 1 capsule by mouth every day.  Dispense: 90 capsule; Refill: 3    4. Nonintractable generalized idiopathic epilepsy without status epilepticus (HCC)  Stable, continue Valium 2 mg twice daily.    5. H/O juvenile rheumatoid arthritis:  Undergoing evaluation as his previous SANJU came back positive.  If his evaluation comes back positive then he is to follow-up with rheumatology.    6. Chronic midline thoracic back pain  Stable, continue Pepcid 20 mg daily.    7. Intractable migraine with aura with status migrainosus  Uncontrolled, going to start Botox injections.    Advised to follow-up with neurology.      Follow-up: Return in about 3 months (around 6/11/2021).

## 2021-03-11 NOTE — ASSESSMENT & PLAN NOTE
This is a chronic health problem for this patient.  Reports that he was prescribed Pepcid 20 mg twice daily which helped with his symptoms a lot.  Now he does not get any pain.  He requested refill for medications.

## 2021-03-11 NOTE — ASSESSMENT & PLAN NOTE
This is a chronic health problem for this patient.  He is now following with neurology.  He had CT and MRI brain for which came back negative.  He is going to start Botox injections.  He also takes magnesium for headaches.  He was prescribed Zomig and Toradol from neurology.  Previously he had tried multiple medications and did not have improvement with that.

## 2021-03-11 NOTE — ASSESSMENT & PLAN NOTE
This is a chronic health problem for this patient.  His work-up in 2019 came back SANJU positive.  Blood work for further evaluation ordered by neurology.  He does not have any joint pain.

## 2021-03-11 NOTE — ASSESSMENT & PLAN NOTE
This is a chronic health problem for this patient.  He takes diazepam 2 mg twice daily.  His symptoms are controlled with this medication.  Due to muscle twitching and muscle pain he is on long-term disability from his work.  He reports that his work notified him that they will no longer provide long-term disability.  He will get more details about this and will let us know.

## 2021-03-11 NOTE — ASSESSMENT & PLAN NOTE
This is a chronic health problem for this patient.  He is taking Cymbalta 30 mg once daily.  He reports that his symptoms are controlled with this medication.  Denies any side effects with this medication.

## 2021-03-11 NOTE — ASSESSMENT & PLAN NOTE
This is a chronic health problem for this patient.  He is taking diazepam 2 mg twice daily.  He takes this on a regular basis to keep his muscle twitching and epilepsy under control.

## 2021-03-12 RX ORDER — DIAZEPAM 2 MG/1
2 TABLET ORAL 2 TIMES DAILY
Qty: 180 TABLET | Refills: 0 | Status: SHIPPED | OUTPATIENT
Start: 2021-03-12 | End: 2021-06-15 | Stop reason: SDUPTHER

## 2021-03-18 DIAGNOSIS — G43.709 CHRONIC MIGRAINE W/O AURA W/O STATUS MIGRAINOSUS, NOT INTRACTABLE: ICD-10-CM

## 2021-03-22 ENCOUNTER — APPOINTMENT (OUTPATIENT)
Dept: LAB | Facility: MEDICAL CENTER | Age: 36
End: 2021-03-22
Payer: COMMERCIAL

## 2021-03-30 RX ORDER — FAMOTIDINE 20 MG/1
TABLET, FILM COATED ORAL
Qty: 180 TABLET | Refills: 3 | Status: SHIPPED | OUTPATIENT
Start: 2021-03-30

## 2021-04-05 ENCOUNTER — TELEPHONE (OUTPATIENT)
Dept: NEUROLOGY | Facility: MEDICAL CENTER | Age: 36
End: 2021-04-05

## 2021-04-05 NOTE — TELEPHONE ENCOUNTER
Please let Sidney know that his Vitamin D level is quite low.  How much is he currently taking daily?

## 2021-04-06 NOTE — TELEPHONE ENCOUNTER
"Marquiset reply from patient:     \"I'm not currently taking any supplements for it, so my only sources are the occasional moments I take outside and the few meals I get with mushrooms in them.\"     Please advise.   "

## 2021-04-08 ENCOUNTER — TELEPHONE (OUTPATIENT)
Dept: NEUROLOGY | Facility: MEDICAL CENTER | Age: 36
End: 2021-04-08

## 2021-04-08 DIAGNOSIS — R76.8 ANA POSITIVE: ICD-10-CM

## 2021-04-08 DIAGNOSIS — R79.89 LOW VITAMIN D LEVEL: ICD-10-CM

## 2021-04-08 DIAGNOSIS — Z87.39 H/O JUVENILE RHEUMATOID ARTHRITIS: ICD-10-CM

## 2021-04-08 RX ORDER — ERGOCALCIFEROL 1.25 MG/1
50000 CAPSULE ORAL
Qty: 5 CAPSULE | Refills: 3 | Status: SHIPPED | OUTPATIENT
Start: 2021-04-08 | End: 2021-04-08

## 2021-04-08 NOTE — TELEPHONE ENCOUNTER
New Rx for Vitamin D high dose every 7 days sent to local pharmacy.    Is he established with rheumatology?  Need a referral?

## 2021-04-08 NOTE — TELEPHONE ENCOUNTER
"Jose Guadalupe reply from patient:       \"I don't have a rheumatologist, but if my tests came back positive for autoimmune activity, I think it would be best to have a referral.     Also, a separate note, the zolmitriptan worked exactly the same as the sumatriptan, so I would prefer to have a prescription for the sumatriptan because it works better for my medical budgeting.\"  "

## 2021-04-12 ENCOUNTER — TELEPHONE (OUTPATIENT)
Dept: NEUROLOGY | Facility: MEDICAL CENTER | Age: 36
End: 2021-04-12

## 2021-04-12 DIAGNOSIS — G43.719 INTRACTABLE CHRONIC MIGRAINE WITHOUT AURA AND WITHOUT STATUS MIGRAINOSUS: ICD-10-CM

## 2021-04-12 DIAGNOSIS — Z87.39 H/O JUVENILE RHEUMATOID ARTHRITIS: ICD-10-CM

## 2021-04-12 RX ORDER — SUMATRIPTAN 100 MG/1
TABLET, FILM COATED ORAL
Qty: 9 TABLET | Refills: 3 | Status: SHIPPED | OUTPATIENT
Start: 2021-04-12

## 2021-04-20 ENCOUNTER — OFFICE VISIT (OUTPATIENT)
Dept: NEUROLOGY | Facility: MEDICAL CENTER | Age: 36
End: 2021-04-20
Attending: NURSE PRACTITIONER
Payer: COMMERCIAL

## 2021-04-20 VITALS
RESPIRATION RATE: 16 BRPM | BODY MASS INDEX: 20.51 KG/M2 | DIASTOLIC BLOOD PRESSURE: 60 MMHG | WEIGHT: 151.46 LBS | SYSTOLIC BLOOD PRESSURE: 104 MMHG | HEART RATE: 89 BPM | TEMPERATURE: 98.4 F | HEIGHT: 72 IN | OXYGEN SATURATION: 96 %

## 2021-04-20 PROCEDURE — 64615 CHEMODENERV MUSC MIGRAINE: CPT | Performed by: NURSE PRACTITIONER

## 2021-04-20 ASSESSMENT — FIBROSIS 4 INDEX: FIB4 SCORE: .5268638761782118808

## 2021-04-20 NOTE — PROGRESS NOTES
Subjective:      Sidney Culver is a 36 y.o. male who presents with Botox Injection (Migraine )            HPI  Initiation of Botox today.    Reports that he has been with a daily headache since 2019.  Has a pain scale of 3/10 at this time with the last spike in pain on Sunday.      Current Outpatient Medications   Medication Sig Dispense Refill   • sumatriptan (IMITREX) 100 MG tablet Take 1/2-1 tablet po at onset of headache, may repeat dose in 2 hours if unrelieved.  Do not exceed more than 2 tablets in 24 hours. 9 tablet 3   • vitamin D, Ergocalciferol, (DRISDOL) 1.25 MG (48350 UT) Cap capsule TAKE 1 CAPSULE BY MOUTH EVERY 7 DAYS 12 capsule 1   • famotidine (PEPCID) 20 MG Tab TAKE 1 TABLET BY MOUTH TWICE DAILY 180 tablet 3   • diazePAM (VALIUM) 2 MG Tab Take 1 tablet by mouth 2 Times a Day for 90 days. 180 tablet 0   • DULoxetine (CYMBALTA) 30 MG Cap DR Particles Take 1 capsule by mouth every day. 90 capsule 3   • ketorolac (TORADOL) 10 MG Tab Take 1 tablet by mouth every 6 hours as needed for Moderate Pain (at onset of migaine.  Do not exceed more than 2 tablets in 24 hours or take more than 72 hours total.). 20 tablet 1   • sumatriptan (IMITREX) 100 MG tablet Take 1 Tab by mouth Once PRN for Migraine for up to 1 dose. 10 Tab 3   • gabapentin (NEURONTIN) 300 MG Cap TK 2 CS PO TID     • Magnesium 500 MG Tab Taking 1 daily and then 1 with the onset of a migraine. 100 Tab 3   • asa/apap/caffeine (EXCEDRIN) 250-250-65 MG Tab Take 1 Tab by mouth every 6 hours as needed for Headache.     • Diclofenac Sodium 1 % Gel Apply to painful muscles up to qid 200 g 11   • zolmitriptan (ZOMIG-ZMT) 5 MG disintegrating tablet Take 1/2-1 tablet po at onset of headache, may repeat dose in 2 hours if unrelieved.  Do not exceed more than 2 tablets in 24 hours. 9 tablet 2     Current Facility-Administered Medications   Medication Dose Route Frequency Provider Last Rate Last Admin   • onabotulinum toxin A (Botox) injection 155  Units  155 Units Injection Once SOCORRO Khan       Objective:     There were no vitals taken for this visit.     Physical Exam            Assessment/Plan:           Chronic Migraine:  Here today for initiation of Botox.     I treated this patient in clinic today with BotoxA 155 units according to the dosing/injection paradigm currently mandated by the FDA for the management of chronic migraine. Specifically, I injected 5 units to the procerus, 5 units to the corrugators bilaterally, a total of 20 units to the frontalis musculature, 20 units to the temporalis bilaterally, 15 units to the occipitalis bilaterally, 10 units to the cervical paraspinals bilaterally and 15 units to the trapezius musculature bilaterally. The remainder of the Botox 200 units mixed but not administered was discarded as wastage per FDA guidelines.     Education/counseling/reduction in medications if pt has medication overuse headache; Frequency of headaches is >15 days monthly with at least 8 migraines monthly; Migraines include at least two of the following: worsened with activity or avoidance of activity with migraines (ie they go lie down), moderate to severe pain intensity, pulsing headache, unilateral headache AND they have either nausea or vomiting OR sensitivity to light and sound.     Although this patient is responding to botox they are NOT migraine free, however, and it is my recommendation Botox be continued at this time.     This patient has chronic daily migraines, defined as having 15 or more headaches days per month, 8 of which are migraines, over a minimum of the last three months. Episodes last more than 4 hours (untreated).  Pt has 2 or more of following (see initial note) -  Headache worsened with activity, pain is moderate to severe intensity, pulsing in nature, unilateral and patient either has nausea/vomiting OR sensitivity to light and sound.  This patient does/does not also have medication  overuse headache.  However our plan to address this includes education, occipital nerve shots, restricting use as directed.      Return for follow-up in 3 months for 2nd set of Botox and evaluation of need for Botox.

## 2021-05-17 ENCOUNTER — TELEPHONE (OUTPATIENT)
Dept: MEDICAL GROUP | Facility: MEDICAL CENTER | Age: 36
End: 2021-05-17

## 2021-05-17 NOTE — TELEPHONE ENCOUNTER
Phone Number Called: [phone]    Call outcome: Left detailed message for patient. Informed to call back with any additional questions.     Message: Left vm stating he could go to the pharmacy to fill his diazePAM.

## 2021-05-18 ENCOUNTER — TELEPHONE (OUTPATIENT)
Dept: MEDICAL GROUP | Facility: MEDICAL CENTER | Age: 36
End: 2021-05-18

## 2021-05-24 ENCOUNTER — OFFICE VISIT (OUTPATIENT)
Dept: MEDICAL GROUP | Facility: MEDICAL CENTER | Age: 36
End: 2021-05-24
Payer: COMMERCIAL

## 2021-05-24 VITALS
HEIGHT: 72 IN | SYSTOLIC BLOOD PRESSURE: 112 MMHG | RESPIRATION RATE: 16 BRPM | OXYGEN SATURATION: 96 % | BODY MASS INDEX: 20.31 KG/M2 | DIASTOLIC BLOOD PRESSURE: 62 MMHG | WEIGHT: 149.91 LBS | HEART RATE: 80 BPM | TEMPERATURE: 98.1 F

## 2021-05-24 DIAGNOSIS — R25.3 MUSCLE TWITCHING: ICD-10-CM

## 2021-05-24 DIAGNOSIS — Z02.89 ENCOUNTER FOR COMPLETION OF FORM WITH PATIENT: ICD-10-CM

## 2021-05-24 DIAGNOSIS — G43.111 INTRACTABLE MIGRAINE WITH AURA WITH STATUS MIGRAINOSUS: ICD-10-CM

## 2021-05-24 DIAGNOSIS — G40.309 NONINTRACTABLE GENERALIZED IDIOPATHIC EPILEPSY WITHOUT STATUS EPILEPTICUS (HCC): ICD-10-CM

## 2021-05-24 PROCEDURE — 99214 OFFICE O/P EST MOD 30 MIN: CPT | Performed by: FAMILY MEDICINE

## 2021-05-24 ASSESSMENT — ENCOUNTER SYMPTOMS
PALPITATIONS: 0
FEVER: 0
VOMITING: 0
NERVOUS/ANXIOUS: 0
SHORTNESS OF BREATH: 0
BACK PAIN: 1
ABDOMINAL PAIN: 0
DIARRHEA: 0
DEPRESSION: 1
CONSTIPATION: 0
COUGH: 0
WHEEZING: 0
HEADACHES: 1
CHILLS: 0

## 2021-05-24 ASSESSMENT — FIBROSIS 4 INDEX: FIB4 SCORE: .5268638761782118808

## 2021-05-24 NOTE — PROGRESS NOTES
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    History of present illness: Sidney Culver is a 36 y.o. male who presented for paperwork.      He has a history of recurrent muscle twitching and intractable migraine with aura.  He takes diazepam 2 mg twice daily.  Due to muscle twitching and muscle pain he is on long-term disability.  He is not able to do any work past 15 to 30 minutes without having muscle twitches.  He does get tired and fatigued easily.    He is following with neurology for migraine headaches.  He had CT and MRI before which came back negative for any abnormality.  He is receiving Botox injections.  He had tried multiple medications before and had no improvement with symptoms.  He is currently on Cymbalta 30 mg daily and gabapentin 300 mg.  History of epilepsy also for which he is on diazepam 2 mg twice daily.  History of depression symptoms which are controlled with Cymbalta 30 mg daily.  He brought paperwork today for attending physician statement for waiver of premium.      Review of systems:     Review of Systems   Constitutional: Positive for malaise/fatigue. Negative for chills and fever.   Respiratory: Negative for cough, shortness of breath and wheezing.    Cardiovascular: Negative for chest pain, palpitations and leg swelling.   Gastrointestinal: Negative for abdominal pain, constipation, diarrhea and vomiting.   Musculoskeletal: Positive for back pain.   Neurological: Positive for headaches.        Muscle twitching   Psychiatric/Behavioral: Positive for depression. The patient is not nervous/anxious.         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  Family History   Problem Relation Age of Onset   • Lung Disease Mother         COPD in a smoker   • Other Mother         epilepsy (late 30s), migraines    • Arthritis Mother    • No Known Problems Father    • No Known Problems Sister    • Cancer Maternal Grandfather         lung cancer, smoker        Social History:    Social History     Tobacco Use   • Smoking status: Former Smoker     Packs/day: 0.00     Years: 4.00     Pack years: 0.00     Types: Cigarettes     Quit date: 5/28/2017     Years since quitting: 3.9   • Smokeless tobacco: Never Used   • Tobacco comment: Former Hookah   Vaping Use   • Vaping Use: Never used   Substance Use Topics   • Alcohol use: No   • Drug use: No        Medications and Allergies:     Current Outpatient Medications   Medication Sig Dispense Refill   • sumatriptan (IMITREX) 100 MG tablet Take 1/2-1 tablet po at onset of headache, may repeat dose in 2 hours if unrelieved.  Do not exceed more than 2 tablets in 24 hours. 9 tablet 3   • vitamin D, Ergocalciferol, (DRISDOL) 1.25 MG (36945 UT) Cap capsule TAKE 1 CAPSULE BY MOUTH EVERY 7 DAYS 12 capsule 1   • famotidine (PEPCID) 20 MG Tab TAKE 1 TABLET BY MOUTH TWICE DAILY 180 tablet 3   • diazePAM (VALIUM) 2 MG Tab Take 1 tablet by mouth 2 Times a Day for 90 days. 180 tablet 0   • DULoxetine (CYMBALTA) 30 MG Cap DR Particles Take 1 capsule by mouth every day. 90 capsule 3    • ketorolac (TORADOL) 10 MG Tab Take 1 tablet by mouth every 6 hours as needed for Moderate Pain (at onset of migaine.  Do not exceed more than 2 tablets in 24 hours or take more than 72 hours total.). 20 tablet 1   • sumatriptan (IMITREX) 100 MG tablet Take 1 Tab by mouth Once PRN for Migraine for up to 1 dose. 10 Tab 3   • gabapentin (NEURONTIN) 300 MG Cap TK 2 CS PO TID     • Magnesium 500 MG Tab Taking 1 daily and then 1 with the onset of a migraine. 100 Tab 3   • asa/apap/caffeine (EXCEDRIN) 250-250-65 MG Tab Take 1 Tab by mouth every 6 hours as needed for Headache.     • Diclofenac Sodium 1 % Gel Apply to painful muscles up to qid 200 g 11     No current facility-administered medications for this visit.        Allergies   Allergen Reactions   • Acetaminophen    • Amitriptyline      Abnormal movements   • Other Drug      medrisil       Vitals:    /62 (BP Location: Left arm, Patient Position: Sitting, BP Cuff Size: Adult)   Pulse 80   Temp 36.7 °C (98.1 °F)   Resp 16   Ht 1.829 m (6')   Wt 68 kg (149 lb 14.6 oz)   SpO2 96%  Body mass index is 20.33 kg/m².    Physical Exam:     Physical Exam  Constitutional:       General: He is not in acute distress.     Appearance: Normal appearance.   HENT:      Head: Normocephalic and atraumatic.   Eyes:      Conjunctiva/sclera: Conjunctivae normal.   Cardiovascular:      Rate and Rhythm: Normal rate and regular rhythm.      Heart sounds: Normal heart sounds. No murmur heard.   No friction rub. No gallop.    Pulmonary:      Effort: Pulmonary effort is normal. No respiratory distress.      Breath sounds: Normal breath sounds. No wheezing or rales.   Musculoskeletal:         General: No deformity.      Cervical back: Neck supple.   Neurological:      Mental Status: He is alert.      Gait: Gait is intact.   Psychiatric:         Mood and Affect: Mood and affect normal.         Judgment: Judgment normal.          Assessment/Plan:    1. Intractable migraine with aura  with status migrainosus  Chronic health problem, trying different medications and Botox injections recently for relief.  Follow-up with neurology for further management.    2. Muscle twitching  Chronic health problem, uncontrolled without medications.  Continue Valium 2 mg twice daily.  He refilled his prescription 2 weeks ago, will contact us when he will be due for his prescription.    Obtained and reviewed patient utilization report from [facility] pharmacy database on 5/24/2021 12:47 PM  prior to writing prescription for controlled substance II, III or IV per [place] bill . Based on assessment of the report,my physical exam if necessary and the patient's health problem, the prescription is medically necessary.     - Controlled Substance Treatment Agreement    3. 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    4. Encounter for completion of form with patient  Paperwork filled and given to patient.  Will fax paperwork.   .    Please note that this dictation was created using voice recognition software. I have made every reasonable attempt to correct obvious errors, but I expect that there are errors of grammar and possibly content that I did not discover before finalizing the note.

## 2021-06-15 DIAGNOSIS — M79.18 MUSCLE PAIN, MYOFASCIAL: ICD-10-CM

## 2021-06-15 DIAGNOSIS — R25.3 MUSCLE TWITCHING: ICD-10-CM

## 2021-06-15 RX ORDER — DIAZEPAM 2 MG/1
2 TABLET ORAL 2 TIMES DAILY
Qty: 180 TABLET | Refills: 0 | Status: SHIPPED | OUTPATIENT
Start: 2021-06-15 | End: 2021-09-13

## 2021-06-16 ENCOUNTER — APPOINTMENT (OUTPATIENT)
Dept: MEDICAL GROUP | Facility: MEDICAL CENTER | Age: 36
End: 2021-06-16
Payer: COMMERCIAL

## 2022-11-09 ENCOUNTER — TELEPHONE (OUTPATIENT)
Dept: HEALTH INFORMATION MANAGEMENT | Facility: OTHER | Age: 37
End: 2022-11-09

## 2023-02-22 NOTE — TELEPHONE ENCOUNTER
Received request via: Pharmacy    Was the patient seen in the last year in this department? Yes    Does the patient have an active prescription (recently filled or refills available) for medication(s) requested? No     Breath sounds clear and equal bilaterally.

## 2025-01-11 NOTE — LETTER
February 12, 2019         Patient: Sidney Culver   YOB: 1985   Date of Visit: 2/12/2019           To Whom it May Concern:    Sidney Culver was seen in my clinic on 2/12/2019. He was seen here today for evaluation of Migraine. His neuro exam was normal in the Urgent Care setting today but it is my recommendation that if his migraine pattern should dramatically change again he should be evaluated in the Emergency Department.    If you have any questions or concerns, please don't hesitate to call.        Sincerely,           Anastasiia Beltran P.A.-C.  Electronically Signed     
Home